# Patient Record
Sex: FEMALE | Race: WHITE | NOT HISPANIC OR LATINO | Employment: UNEMPLOYED | URBAN - METROPOLITAN AREA
[De-identification: names, ages, dates, MRNs, and addresses within clinical notes are randomized per-mention and may not be internally consistent; named-entity substitution may affect disease eponyms.]

---

## 2021-12-22 ENCOUNTER — HISTORICAL (OUTPATIENT)
Dept: ADMINISTRATIVE | Facility: HOSPITAL | Age: 26
End: 2021-12-22

## 2024-02-15 ENCOUNTER — OFFICE VISIT (OUTPATIENT)
Dept: URGENT CARE | Facility: CLINIC | Age: 29
End: 2024-02-15
Payer: MEDICAID

## 2024-02-15 VITALS
HEART RATE: 70 BPM | DIASTOLIC BLOOD PRESSURE: 74 MMHG | HEIGHT: 67 IN | TEMPERATURE: 98 F | OXYGEN SATURATION: 98 % | RESPIRATION RATE: 20 BRPM | BODY MASS INDEX: 28.89 KG/M2 | SYSTOLIC BLOOD PRESSURE: 118 MMHG | WEIGHT: 184.06 LBS

## 2024-02-15 DIAGNOSIS — H65.91 RIGHT NON-SUPPURATIVE OTITIS MEDIA: Primary | ICD-10-CM

## 2024-02-15 DIAGNOSIS — J02.9 SORE THROAT: ICD-10-CM

## 2024-02-15 DIAGNOSIS — R05.9 COUGH, UNSPECIFIED TYPE: ICD-10-CM

## 2024-02-15 LAB
CTP QC/QA: YES
MOLECULAR STREP A: NEGATIVE
POC MOLECULAR INFLUENZA A AGN: NEGATIVE
POC MOLECULAR INFLUENZA B AGN: NEGATIVE
SARS-COV-2 RDRP RESP QL NAA+PROBE: NEGATIVE

## 2024-02-15 PROCEDURE — 99214 OFFICE O/P EST MOD 30 MIN: CPT | Mod: PBBFAC | Performed by: FAMILY MEDICINE

## 2024-02-15 PROCEDURE — 99203 OFFICE O/P NEW LOW 30 MIN: CPT | Mod: S$PBB,,, | Performed by: FAMILY MEDICINE

## 2024-02-15 PROCEDURE — 87635 SARS-COV-2 COVID-19 AMP PRB: CPT | Mod: PBBFAC | Performed by: FAMILY MEDICINE

## 2024-02-15 PROCEDURE — 87502 INFLUENZA DNA AMP PROBE: CPT | Mod: PBBFAC | Performed by: FAMILY MEDICINE

## 2024-02-15 PROCEDURE — 87651 STREP A DNA AMP PROBE: CPT | Mod: PBBFAC | Performed by: FAMILY MEDICINE

## 2024-02-15 RX ORDER — ONDANSETRON 4 MG/1
4 TABLET, ORALLY DISINTEGRATING ORAL EVERY 8 HOURS PRN
COMMUNITY
Start: 2024-02-06

## 2024-02-15 RX ORDER — AMOXICILLIN AND CLAVULANATE POTASSIUM 875; 125 MG/1; MG/1
1 TABLET, FILM COATED ORAL 2 TIMES DAILY
Qty: 20 TABLET | Refills: 0 | Status: SHIPPED | OUTPATIENT
Start: 2024-02-15 | End: 2024-02-25

## 2024-02-15 NOTE — PROGRESS NOTES
"Subjective:       Patient ID: Kennedi Adrian is a 28 y.o. female.    Vitals:  height is 5' 7" (1.702 m) and weight is 83.5 kg (184 lb 1.4 oz). Her temperature is 98 °F (36.7 °C). Her blood pressure is 118/74 and her pulse is 70. Her respiration is 20 and oxygen saturation is 98%.     Chief Complaint: URI (Cough,sore throat x 5days)    Patient is 10 weeks pregnant.  Having continued cough, postnasal drip, but now right ear pain, no drainage.  No fever.    URI   Associated symptoms include congestion, coughing and ear pain. Pertinent negatives include no chest pain, joint swelling, rash, sinus pain, swollen glands, vomiting or wheezing.         HENT:  Positive for ear pain and congestion. Negative for sinus pain.    Cardiovascular:  Negative for chest pain.   Respiratory:  Positive for cough. Negative for wheezing.    Gastrointestinal:  Negative for vomiting.   Skin:  Negative for rash.       Objective:   Physical Exam   Constitutional: She appears well-developed.  Non-toxic appearance. She does not appear ill. No distress.   HENT:   Head: Atraumatic.   Ears:   Right Ear: No mastoid tenderness. Tympanic membrane is injected. Tympanic membrane is not perforated.   Left Ear: Tympanic membrane normal.   Nose: No purulent discharge. Right sinus exhibits no maxillary sinus tenderness and no frontal sinus tenderness. Left sinus exhibits no maxillary sinus tenderness and no frontal sinus tenderness.   Mouth/Throat: Uvula is midline. No uvula swelling. Cobblestoning present.   Eyes: Right eye exhibits no discharge. Left eye exhibits no discharge. Extraocular movement intact   Neck: Neck supple. No neck rigidity present.   Cardiovascular: Regular rhythm.   Pulmonary/Chest: Effort normal and breath sounds normal. No respiratory distress. She has no wheezes. She has no rales.   Lymphadenopathy:     She has no cervical adenopathy.   Neurological: She is alert.   Skin: Skin is warm, dry and not diaphoretic.   Psychiatric: Her " behavior is normal.   Nursing note and vitals reviewed.    Results for orders placed or performed in visit on 02/15/24   POCT COVID-19 Rapid Screening   Result Value Ref Range    POC Rapid COVID Negative Negative     Acceptable Yes    POCT Influenza A/B Molecular   Result Value Ref Range    POC Molecular Influenza A Ag Negative Negative, Not Reported    POC Molecular Influenza B Ag Negative Negative, Not Reported     Acceptable Yes    POCT Strep A, Molecular   Result Value Ref Range    Molecular Strep A, POC Negative Negative     Acceptable Yes          Assessment:     1. Right non-suppurative otitis media    2. Cough, unspecified type    3. Sore throat          Plan:   Will treat right otitis media with Augmentin.  Encouraged fluids.  She understands to clear all medications with her OB provider.  ER precautions.  Return to urgent care if we can help.    Right non-suppurative otitis media  -     amoxicillin-clavulanate 875-125mg (AUGMENTIN) 875-125 mg per tablet; Take 1 tablet by mouth 2 (two) times daily. for 10 days  Dispense: 20 tablet; Refill: 0    Cough, unspecified type  -     POCT COVID-19 Rapid Screening  -     POCT Influenza A/B Molecular  -     POCT Strep A, Molecular    Sore throat  -     POCT COVID-19 Rapid Screening  -     POCT Influenza A/B Molecular  -     POCT Strep A, Molecular        Please note: This chart was completed via voice to text dictation. It may contain typographical/word recognition errors. If there are any questions, please contact the provider for final clarification.

## 2024-03-05 ENCOUNTER — LAB VISIT (OUTPATIENT)
Dept: LAB | Facility: HOSPITAL | Age: 29
End: 2024-03-05
Attending: OBSTETRICS & GYNECOLOGY
Payer: MEDICAID

## 2024-03-05 DIAGNOSIS — Z34.02 ENCOUNTER FOR SUPERVISION OF NORMAL FIRST PREGNANCY IN SECOND TRIMESTER: Primary | ICD-10-CM

## 2024-03-05 LAB
ERYTHROCYTE [DISTWIDTH] IN BLOOD BY AUTOMATED COUNT: 13 % (ref 11.5–17)
FT4I SERPL CALC-MCNC: 2.54 (ref 2.6–3.6)
GROUP & RH: NORMAL
HCT VFR BLD AUTO: 35.5 % (ref 37–47)
HGB BLD-MCNC: 12.4 G/DL (ref 12–16)
INDIRECT COOMBS: NORMAL
MCH RBC QN AUTO: 29.6 PG (ref 27–31)
MCHC RBC AUTO-ENTMCNC: 34.9 G/DL (ref 33–36)
MCV RBC AUTO: 84.7 FL (ref 80–94)
NRBC BLD AUTO-RTO: 0 %
PLATELET # BLD AUTO: 230 X10(3)/MCL (ref 130–400)
PMV BLD AUTO: 10.5 FL (ref 7.4–10.4)
RBC # BLD AUTO: 4.19 X10(6)/MCL (ref 4.2–5.4)
SPECIMEN OUTDATE: NORMAL
T PALLIDUM AB SER QL: NONREACTIVE
T3RU NFR SERPL: 26.68 % (ref 31–39)
T4 SERPL-MCNC: 9.51 UG/DL (ref 4.87–11.72)
TSH SERPL-ACNC: 3.37 UIU/ML (ref 0.35–4.94)
WBC # SPEC AUTO: 8.02 X10(3)/MCL (ref 4.5–11.5)

## 2024-03-05 PROCEDURE — 87086 URINE CULTURE/COLONY COUNT: CPT

## 2024-03-05 PROCEDURE — 84443 ASSAY THYROID STIM HORMONE: CPT

## 2024-03-05 PROCEDURE — 87340 HEPATITIS B SURFACE AG IA: CPT

## 2024-03-05 PROCEDURE — 36415 COLL VENOUS BLD VENIPUNCTURE: CPT

## 2024-03-05 PROCEDURE — 84436 ASSAY OF TOTAL THYROXINE: CPT

## 2024-03-05 PROCEDURE — 86762 RUBELLA ANTIBODY: CPT

## 2024-03-05 PROCEDURE — 86803 HEPATITIS C AB TEST: CPT

## 2024-03-05 PROCEDURE — 87389 HIV-1 AG W/HIV-1&-2 AB AG IA: CPT

## 2024-03-05 PROCEDURE — 85027 COMPLETE CBC AUTOMATED: CPT

## 2024-03-05 PROCEDURE — 86780 TREPONEMA PALLIDUM: CPT

## 2024-03-05 PROCEDURE — 86850 RBC ANTIBODY SCREEN: CPT | Performed by: OBSTETRICS & GYNECOLOGY

## 2024-03-05 PROCEDURE — 85660 RBC SICKLE CELL TEST: CPT

## 2024-03-06 LAB
HBV SURFACE AG SERPL QL IA: NONREACTIVE
HCV AB SERPL QL IA: NONREACTIVE
HGB S BLD QL SOLY: NEGATIVE
HIV 1+2 AB+HIV1 P24 AG SERPL QL IA: NONREACTIVE
RUBV IGG SERPL IA-ACNC: 0.6
RUBV IGG SERPL QL IA: NEGATIVE

## 2024-03-07 LAB — BACTERIA UR CULT: NORMAL

## 2024-07-03 ENCOUNTER — HOSPITAL ENCOUNTER (EMERGENCY)
Facility: HOSPITAL | Age: 29
Discharge: HOME | End: 2024-07-03
Payer: MEDICAID

## 2024-07-03 ENCOUNTER — HOSPITAL ENCOUNTER (OUTPATIENT)
Facility: HOSPITAL | Age: 29
Discharge: OTHER INSTITUTION | End: 2024-07-03
Attending: OBSTETRICS & GYNECOLOGY | Admitting: OBSTETRICS & GYNECOLOGY
Payer: MEDICAID

## 2024-07-03 VITALS
RESPIRATION RATE: 18 BRPM | OXYGEN SATURATION: 100 % | WEIGHT: 220 LBS | HEIGHT: 67 IN | BODY MASS INDEX: 34.53 KG/M2 | DIASTOLIC BLOOD PRESSURE: 71 MMHG | SYSTOLIC BLOOD PRESSURE: 125 MMHG | TEMPERATURE: 98 F | HEART RATE: 68 BPM

## 2024-07-03 VITALS
DIASTOLIC BLOOD PRESSURE: 67 MMHG | SYSTOLIC BLOOD PRESSURE: 126 MMHG | HEART RATE: 90 BPM | RESPIRATION RATE: 20 BRPM | OXYGEN SATURATION: 99 % | TEMPERATURE: 98 F

## 2024-07-03 DIAGNOSIS — O28.8 EQUIVOCAL NON-STRESS TEST: Primary | ICD-10-CM

## 2024-07-03 DIAGNOSIS — Z03.71 ENCOUNTER FOR SUSPECTED PROM, WITH RUPTURE OF MEMBRANES NOT FOUND: Primary | ICD-10-CM

## 2024-07-03 DIAGNOSIS — O47.9 UTERINE CONTRACTIONS: ICD-10-CM

## 2024-07-03 PROBLEM — Z3A.30 30 WEEKS GESTATION OF PREGNANCY: Status: ACTIVE | Noted: 2024-07-03

## 2024-07-03 LAB
BACTERIA #/AREA URNS AUTO: ABNORMAL /HPF
BILIRUB UR QL STRIP.AUTO: NEGATIVE
CLARITY UR: CLEAR
COLOR UR AUTO: YELLOW
GLUCOSE UR QL STRIP: NEGATIVE
HGB UR QL STRIP: NEGATIVE
KETONES UR QL STRIP: NEGATIVE
LEUKOCYTE ESTERASE UR QL STRIP: ABNORMAL
NITRITE UR QL STRIP: NEGATIVE
PH UR STRIP: 7 [PH]
PROT UR QL STRIP: NEGATIVE
RBC #/AREA URNS AUTO: ABNORMAL /HPF
ROMPLUS TEST: NEGATIVE
SP GR UR STRIP.AUTO: 1.02 (ref 1–1.03)
SQUAMOUS #/AREA URNS AUTO: ABNORMAL /HPF
UROBILINOGEN UR STRIP-ACNC: 0.2
WBC #/AREA URNS AUTO: ABNORMAL /HPF

## 2024-07-03 PROCEDURE — 99211 OFF/OP EST MAY X REQ PHY/QHP: CPT | Mod: 25

## 2024-07-03 PROCEDURE — 99284 EMERGENCY DEPT VISIT MOD MDM: CPT | Mod: 25,27

## 2024-07-03 PROCEDURE — 81003 URINALYSIS AUTO W/O SCOPE: CPT | Performed by: OBSTETRICS & GYNECOLOGY

## 2024-07-03 PROCEDURE — 63600175 PHARM REV CODE 636 W HCPCS: Performed by: OBSTETRICS & GYNECOLOGY

## 2024-07-03 PROCEDURE — 84112 EVAL AMNIOTIC FLUID PROTEIN: CPT

## 2024-07-03 PROCEDURE — 84112 EVAL AMNIOTIC FLUID PROTEIN: CPT | Performed by: OBSTETRICS & GYNECOLOGY

## 2024-07-03 PROCEDURE — 81015 MICROSCOPIC EXAM OF URINE: CPT | Performed by: OBSTETRICS & GYNECOLOGY

## 2024-07-03 RX ORDER — ACETAMINOPHEN 500 MG
500 TABLET ORAL EVERY 6 HOURS PRN
Status: DISCONTINUED | OUTPATIENT
Start: 2024-07-03 | End: 2024-07-03 | Stop reason: HOSPADM

## 2024-07-03 RX ORDER — SODIUM CHLORIDE, SODIUM LACTATE, POTASSIUM CHLORIDE, CALCIUM CHLORIDE 600; 310; 30; 20 MG/100ML; MG/100ML; MG/100ML; MG/100ML
INJECTION, SOLUTION INTRAVENOUS CONTINUOUS
Status: DISCONTINUED | OUTPATIENT
Start: 2024-07-03 | End: 2024-07-03 | Stop reason: HOSPADM

## 2024-07-03 RX ORDER — ONDANSETRON 4 MG/1
8 TABLET, ORALLY DISINTEGRATING ORAL EVERY 8 HOURS PRN
Status: DISCONTINUED | OUTPATIENT
Start: 2024-07-03 | End: 2024-07-03 | Stop reason: HOSPADM

## 2024-07-03 RX ADMIN — SODIUM CHLORIDE, POTASSIUM CHLORIDE, SODIUM LACTATE AND CALCIUM CHLORIDE: 600; 310; 30; 20 INJECTION, SOLUTION INTRAVENOUS at 01:07

## 2024-07-03 NOTE — PROVIDER TRANSFER
HISTORY AND PHYSICAL                                                OBSTETRICS          Subjective:      Kennedi Adrian is a 29 y.o.  female with IUP at 30w1d weeks gestation who presents to L&D  c/o ? LOF this morning. Was found to have negative Nitrazine, negative and a normal KELLE. However, during morniting was noted to have occasional variable decelerations, BPP 8/10.    States positive fetal movement. Denies vaginal bleeding, vaginal discharge,   or contractions. Negative Preeclampsia ROS.     Pertinent medical history for this pregnancy includes states abnl Osul with OB.      Care this pregnancy has been with Dr. Chu    PMHx: History reviewed. No pertinent past medical history.    PSHx: Breast Augmentation     All: Review of patient's allergies indicates:  No Known Allergies    Meds:   Medications Prior to Admission   Medication Sig Dispense Refill Last Dose    ondansetron (ZOFRAN-ODT) 4 MG TbDL Take 4 mg by mouth every 8 (eight) hours as needed.          SH:   Social History     Socioeconomic History    Marital status: Single   Tobacco Use    Smoking status: Never    Smokeless tobacco: Never   Substance and Sexual Activity    Alcohol use: Not Currently    Drug use: Not Currently    Sexual activity: Yes     Partners: Male       FH:   Family History   Problem Relation Name Age of Onset    No Known Problems Mother      No Known Problems Father         OBHx:   OB History    Para Term  AB Living   1 0 0 0 0 0   SAB IAB Ectopic Multiple Live Births   0 0 0 0 0      # Outcome Date GA Lbr Glenn/2nd Weight Sex Type Anes PTL Lv   1 Current                Objective:      /67   Pulse 90   Temp 98 °F (36.7 °C)   Resp 20   SpO2 99%        Chaperone present.       Constitutional: General appearance: healthy, well-nourished and well-developed  Psychiatric:  Orientation to time, place and person. Normal mood and affect and active, alert   Abdomen: Auscultation/Inspection/Palpation:  "GRAVIDNo tenderness or masses. Soft, nondistended      Female Genitalia:      Vulva: no masses, atrophy or lesions      Bladder/Urethra: no urethral discharge or mass, normal meatus, bladder non-distended.   Crx: cl/th/hi         ABO/Rh:   Lab Results   Component Value Date/Time    GROUPTRH O POS 2024 04:06 PM     H&H:  Lab Results   Component Value Date/Time    HGB 12.4 2024 04:06 PM    HCT 35.5 (L) 2024 04:06 PM     Platelet:  Lab Results   Component Value Date/Time     2024 04:06 PM     Osulivan: No results found for: "YATB5PW", "VSRP3FQ", "FVJA9SQ"  HIV:   Lab Results   Component Value Date/Time    HIV Nonreactive 2024 04:06 PM     RPR:  Lab Results   Component Value Date/Time    SYPHAB Nonreactive 2024 04:06 PM     Hepatitis B Surface Antigen:  Lab Results   Component Value Date/Time    HEPBSAG Nonreactive 2024 04:06 PM     Hepatitis C:  Lab Results   Component Value Date/Time    HEPCAB Nonreactive 2024 04:06 PM     Rubella Immune Status:  Lab Results   Component Value Date/Time    RUBABIGG Negative 2024 04:06 PM    RUBABIGGINDX 0.6 2024 04:06 PM           Assessment:     29 y.o.  at 30w1d weeks gestation.       Active Hospital Problems    Diagnosis  POA    *Equivocal non-stress test [O28.8]  Yes    30 weeks gestation of pregnancy [Z3A.30]  Not Applicable      Resolved Hospital Problems   No resolved problems to display.          Plan:     1. Equivocal fetal testing, continued  occasional variable decelerations. [facility] on diversion. Pt to be transferred to  triage for prolonged monitoring accepted by Dr. Berg. Cont IVF. Fetal testing currently reassuring and stable for transfer. PT currently asymptomatic.         " show

## 2024-07-03 NOTE — ED PROVIDER NOTES
Encounter Date: 7/3/2024       History     Chief Complaint   Patient presents with    fetal monitoring     Iup at 30.1 transferred from [facility] for further monitoring after a fetal decel was noted while being evaluated for possible pprom. Ob is sheila Kolb Eliane Adrian is a 29 y.o.  female with IUP at 30w1d weeks gestation who was transferred from [place], [place] for fetal monitoring. She presented complaining of leakage of fluid. Her ROM plus was negative. While there she had variables on fetal monitoring. BPP was done and 8/10. OB at the outside hospital desired prolonged monitoring but was unable to accommodate the patient due to being on diversion.    This IUP is uncomplicated. Patient denies contractions, denies vaginal bleeding, denies LOF.   Fetal Movement: normal.    The history is provided by the patient.     Review of patient's allergies indicates:  No Known Allergies  No past medical history on file.  No past surgical history on file.  Family History   Problem Relation Name Age of Onset    No Known Problems Mother      No Known Problems Father       Social History     Tobacco Use    Smoking status: Never    Smokeless tobacco: Never   Substance Use Topics    Alcohol use: Not Currently    Drug use: Not Currently     Review of Systems   Constitutional:  Negative for chills and fatigue.   Respiratory:  Negative for chest tightness and shortness of breath.    Cardiovascular:  Negative for chest pain.   Gastrointestinal:  Negative for abdominal pain, nausea and vomiting.   Genitourinary:  Negative for frequency, menstrual problem, pelvic pain, vaginal bleeding and vaginal discharge.   Musculoskeletal:  Negative for back pain.   Neurological:  Negative for dizziness and light-headedness.   Psychiatric/Behavioral:  Negative for confusion and sleep disturbance. The patient is not nervous/anxious.        Physical Exam     Initial Vitals   BP Pulse Resp Temp SpO2   24 1533 24 1530 24 1541  07/03/24 1541 07/03/24 1530   125/71 72 18 98 °F (36.7 °C) 100 %      MAP       --                Physical Exam    Nursing note and vitals reviewed.  Constitutional: She appears well-developed and well-nourished. She is not diaphoretic. No distress.   HENT:   Head: Normocephalic and atraumatic.   Eyes: Conjunctivae and EOM are normal.   Neck:   Normal range of motion.  Cardiovascular:  Normal rate.           Pulmonary/Chest: No respiratory distress.   Abdominal: Abdomen is soft. There is no abdominal tenderness.   Genitourinary:    No vaginal discharge.     Musculoskeletal:         General: Normal range of motion.      Cervical back: Normal range of motion.     Neurological: She is alert and oriented to person, place, and time.   Skin: Skin is warm and dry.   Psychiatric: She has a normal mood and affect.     OB LABOR EXAM:   Pre-Term Labor: No.   Membranes ruptured: No.     Vaginal Bleeding: none present.     Dilatation: 0.       Amniotic Fluid Color: no fluid.     Comments: NST Interpretation:   140 BPM baseline  Variability: moderate  Accelerations: present  Decelerations: occasional variables  Contractions: none    Clinical Impression: Reactive Non-Stress Test         ED Course   Procedures  Labs Reviewed - No data to display       Imaging Results    None          Medications - No data to display  Medical Decision Making  Problems Addressed:  Encounter for suspected PROM, with rupture of membranes not found:     Details: - ROM plus negative  - NST reassuring  - BPP 8/10   - Precautions given to patient        Clinical Impression:  Final diagnoses:  [Z03.71] Encounter for suspected PROM, with rupture of membranes not found (Primary)         Patient Instructions:   - Pt was given routine pregnancy instructions including to return to triage if she has any vaginal bleeding, any loss of fluid like her water broke, decreased fetal movement, or contractions Q 5min lasting for 2 or more hours.     Pt was also instructed  to drink copious amounts of water. Patient voiced understanding of all these instructions and was subsequently discharged home. Tylenol use and maternity belt use discussed. All questions answered. Pt left [facility] with good understanding of plan.   Preeclampsia/ROM/labor/fever/decreased FM with [facility] precautions discussed, voiced understanding      She will follow up with her primary OB as scheduled       ED Disposition Condition    Discharge Good          ED Prescriptions    None       Follow-up Information    None          Kym Wild MD  07/03/24 [id number]

## 2024-08-21 ENCOUNTER — LAB REQUISITION (OUTPATIENT)
Dept: LAB | Facility: HOSPITAL | Age: 29
End: 2024-08-21
Payer: MEDICAID

## 2024-08-21 DIAGNOSIS — Z34.80 ENCOUNTER FOR SUPERVISION OF OTHER NORMAL PREGNANCY, UNSPECIFIED TRIMESTER: ICD-10-CM

## 2024-08-21 LAB — PRENATAL STREP B CULTURE: NEGATIVE

## 2024-08-21 PROCEDURE — 87081 CULTURE SCREEN ONLY: CPT | Performed by: OBSTETRICS & GYNECOLOGY

## 2024-08-24 LAB — BACTERIA SPEC CULT: NORMAL

## 2024-09-04 ENCOUNTER — HOSPITAL ENCOUNTER (INPATIENT)
Facility: HOSPITAL | Age: 29
LOS: 4 days | Discharge: HOME | End: 2024-09-08
Attending: OBSTETRICS & GYNECOLOGY | Admitting: OBSTETRICS & GYNECOLOGY
Payer: MEDICAID

## 2024-09-04 ENCOUNTER — ANESTHESIA (OUTPATIENT)
Dept: OBSTETRICS AND GYNECOLOGY | Facility: HOSPITAL | Age: 29
End: 2024-09-04
Payer: MEDICAID

## 2024-09-04 ENCOUNTER — ANESTHESIA EVENT (OUTPATIENT)
Dept: OBSTETRICS AND GYNECOLOGY | Facility: HOSPITAL | Age: 29
End: 2024-09-04
Payer: MEDICAID

## 2024-09-04 DIAGNOSIS — Z3A.30 30 WEEKS GESTATION OF PREGNANCY: ICD-10-CM

## 2024-09-04 DIAGNOSIS — Z34.90 ENCOUNTER FOR INDUCTION OF LABOR: Primary | ICD-10-CM

## 2024-09-04 DIAGNOSIS — O28.8 EQUIVOCAL NON-STRESS TEST: ICD-10-CM

## 2024-09-04 DIAGNOSIS — O61.9 FAILED INDUCTION OF LABOR: ICD-10-CM

## 2024-09-04 LAB
BASOPHILS # BLD AUTO: 0.01 X10(3)/MCL
BASOPHILS NFR BLD AUTO: 0.1 %
EOSINOPHIL # BLD AUTO: 0.07 X10(3)/MCL (ref 0–0.9)
EOSINOPHIL NFR BLD AUTO: 0.8 %
ERYTHROCYTE [DISTWIDTH] IN BLOOD BY AUTOMATED COUNT: 14 % (ref 11.5–17)
GROUP & RH: NORMAL
HCT VFR BLD AUTO: 29.2 % (ref 37–47)
HGB BLD-MCNC: 9.2 G/DL (ref 12–16)
IMM GRANULOCYTES # BLD AUTO: 0.14 X10(3)/MCL (ref 0–0.04)
IMM GRANULOCYTES NFR BLD AUTO: 1.6 %
INDIRECT COOMBS: NORMAL
LYMPHOCYTES # BLD AUTO: 2.25 X10(3)/MCL (ref 0.6–4.6)
LYMPHOCYTES NFR BLD AUTO: 25.1 %
MCH RBC QN AUTO: 24.3 PG (ref 27–31)
MCHC RBC AUTO-ENTMCNC: 31.5 G/DL (ref 33–36)
MCV RBC AUTO: 77 FL (ref 80–94)
MONOCYTES # BLD AUTO: 1.41 X10(3)/MCL (ref 0.1–1.3)
MONOCYTES NFR BLD AUTO: 15.7 %
NEUTROPHILS # BLD AUTO: 5.09 X10(3)/MCL (ref 2.1–9.2)
NEUTROPHILS NFR BLD AUTO: 56.7 %
NRBC BLD AUTO-RTO: 0 %
PLATELET # BLD AUTO: 215 X10(3)/MCL (ref 130–400)
PMV BLD AUTO: 11.2 FL (ref 7.4–10.4)
RBC # BLD AUTO: 3.79 X10(6)/MCL (ref 4.2–5.4)
SPECIMEN OUTDATE: NORMAL
T PALLIDUM AB SER QL: NONREACTIVE
WBC # BLD AUTO: 8.97 X10(3)/MCL (ref 4.5–11.5)

## 2024-09-04 PROCEDURE — 63600175 PHARM REV CODE 636 W HCPCS: Performed by: OBSTETRICS & GYNECOLOGY

## 2024-09-04 PROCEDURE — 11000001 HC ACUTE MED/SURG PRIVATE ROOM

## 2024-09-04 PROCEDURE — 85025 COMPLETE CBC W/AUTO DIFF WBC: CPT | Performed by: OBSTETRICS & GYNECOLOGY

## 2024-09-04 PROCEDURE — 86780 TREPONEMA PALLIDUM: CPT | Performed by: OBSTETRICS & GYNECOLOGY

## 2024-09-04 PROCEDURE — 86850 RBC ANTIBODY SCREEN: CPT | Performed by: OBSTETRICS & GYNECOLOGY

## 2024-09-04 PROCEDURE — 25000003 PHARM REV CODE 250: Performed by: OBSTETRICS & GYNECOLOGY

## 2024-09-04 PROCEDURE — 86900 BLOOD TYPING SEROLOGIC ABO: CPT | Performed by: OBSTETRICS & GYNECOLOGY

## 2024-09-04 RX ORDER — MISOPROSTOL 100 UG/1
800 TABLET ORAL ONCE AS NEEDED
Status: DISCONTINUED | OUTPATIENT
Start: 2024-09-04 | End: 2024-09-04

## 2024-09-04 RX ORDER — ONDANSETRON 4 MG/1
8 TABLET, ORALLY DISINTEGRATING ORAL EVERY 8 HOURS PRN
Status: DISCONTINUED | OUTPATIENT
Start: 2024-09-04 | End: 2024-09-04

## 2024-09-04 RX ORDER — OXYTOCIN 10 [USP'U]/ML
10 INJECTION, SOLUTION INTRAMUSCULAR; INTRAVENOUS ONCE AS NEEDED
Status: DISCONTINUED | OUTPATIENT
Start: 2024-09-04 | End: 2024-09-04

## 2024-09-04 RX ORDER — OXYTOCIN-SODIUM CHLORIDE 0.9% IV SOLN 30 UNIT/500ML 30-0.9/5 UT/ML-%
10 SOLUTION INTRAVENOUS ONCE AS NEEDED
Status: DISCONTINUED | OUTPATIENT
Start: 2024-09-04 | End: 2024-09-08 | Stop reason: HOSPADM

## 2024-09-04 RX ORDER — DIPHENOXYLATE HYDROCHLORIDE AND ATROPINE SULFATE 2.5; .025 MG/1; MG/1
2 TABLET ORAL EVERY 6 HOURS PRN
Status: DISCONTINUED | OUTPATIENT
Start: 2024-09-04 | End: 2024-09-04

## 2024-09-04 RX ORDER — ONDANSETRON 4 MG/1
8 TABLET, ORALLY DISINTEGRATING ORAL EVERY 8 HOURS PRN
Status: DISCONTINUED | OUTPATIENT
Start: 2024-09-04 | End: 2024-09-08 | Stop reason: HOSPADM

## 2024-09-04 RX ORDER — CARBOPROST TROMETHAMINE 250 UG/ML
250 INJECTION, SOLUTION INTRAMUSCULAR
Status: DISCONTINUED | OUTPATIENT
Start: 2024-09-04 | End: 2024-09-08 | Stop reason: HOSPADM

## 2024-09-04 RX ORDER — MISOPROSTOL 100 UG/1
800 TABLET ORAL ONCE AS NEEDED
Status: DISCONTINUED | OUTPATIENT
Start: 2024-09-04 | End: 2024-09-08 | Stop reason: HOSPADM

## 2024-09-04 RX ORDER — METHYLERGONOVINE MALEATE 0.2 MG/ML
200 INJECTION INTRAVENOUS ONCE AS NEEDED
Status: DISCONTINUED | OUTPATIENT
Start: 2024-09-04 | End: 2024-09-04

## 2024-09-04 RX ORDER — SODIUM CHLORIDE, SODIUM LACTATE, POTASSIUM CHLORIDE, CALCIUM CHLORIDE 600; 310; 30; 20 MG/100ML; MG/100ML; MG/100ML; MG/100ML
INJECTION, SOLUTION INTRAVENOUS CONTINUOUS
Status: DISCONTINUED | OUTPATIENT
Start: 2024-09-04 | End: 2024-09-08 | Stop reason: HOSPADM

## 2024-09-04 RX ORDER — OXYTOCIN-SODIUM CHLORIDE 0.9% IV SOLN 30 UNIT/500ML 30-0.9/5 UT/ML-%
95 SOLUTION INTRAVENOUS ONCE AS NEEDED
Status: DISCONTINUED | OUTPATIENT
Start: 2024-09-04 | End: 2024-09-04

## 2024-09-04 RX ORDER — OXYTOCIN-SODIUM CHLORIDE 0.9% IV SOLN 30 UNIT/500ML 30-0.9/5 UT/ML-%
10 SOLUTION INTRAVENOUS ONCE
Status: DISCONTINUED | OUTPATIENT
Start: 2024-09-04 | End: 2024-09-08 | Stop reason: HOSPADM

## 2024-09-04 RX ORDER — CALCIUM CARBONATE 200(500)MG
500 TABLET,CHEWABLE ORAL 3 TIMES DAILY PRN
Status: DISCONTINUED | OUTPATIENT
Start: 2024-09-04 | End: 2024-09-04

## 2024-09-04 RX ORDER — CALCIUM CARBONATE 200(500)MG
500 TABLET,CHEWABLE ORAL 3 TIMES DAILY PRN
Status: DISCONTINUED | OUTPATIENT
Start: 2024-09-04 | End: 2024-09-08 | Stop reason: HOSPADM

## 2024-09-04 RX ORDER — OXYTOCIN-SODIUM CHLORIDE 0.9% IV SOLN 30 UNIT/500ML 30-0.9/5 UT/ML-%
95 SOLUTION INTRAVENOUS ONCE
Status: DISCONTINUED | OUTPATIENT
Start: 2024-09-04 | End: 2024-09-08 | Stop reason: HOSPADM

## 2024-09-04 RX ORDER — OXYTOCIN-SODIUM CHLORIDE 0.9% IV SOLN 30 UNIT/500ML 30-0.9/5 UT/ML-%
95 SOLUTION INTRAVENOUS ONCE AS NEEDED
Status: DISCONTINUED | OUTPATIENT
Start: 2024-09-04 | End: 2024-09-08 | Stop reason: HOSPADM

## 2024-09-04 RX ORDER — LIDOCAINE HYDROCHLORIDE 10 MG/ML
10 INJECTION, SOLUTION INFILTRATION; PERINEURAL ONCE AS NEEDED
Status: DISCONTINUED | OUTPATIENT
Start: 2024-09-04 | End: 2024-09-08 | Stop reason: HOSPADM

## 2024-09-04 RX ORDER — MEPERIDINE HYDROCHLORIDE 25 MG/ML
25 INJECTION INTRAMUSCULAR; INTRAVENOUS; SUBCUTANEOUS ONCE
Status: COMPLETED | OUTPATIENT
Start: 2024-09-04 | End: 2024-09-04

## 2024-09-04 RX ORDER — METHYLERGONOVINE MALEATE 0.2 MG/ML
200 INJECTION INTRAVENOUS ONCE AS NEEDED
Status: DISCONTINUED | OUTPATIENT
Start: 2024-09-04 | End: 2024-09-08 | Stop reason: HOSPADM

## 2024-09-04 RX ORDER — DIPHENOXYLATE HYDROCHLORIDE AND ATROPINE SULFATE 2.5; .025 MG/1; MG/1
2 TABLET ORAL EVERY 6 HOURS PRN
Status: DISCONTINUED | OUTPATIENT
Start: 2024-09-04 | End: 2024-09-08 | Stop reason: HOSPADM

## 2024-09-04 RX ORDER — MUPIROCIN 20 MG/G
OINTMENT TOPICAL
OUTPATIENT
Start: 2024-09-04

## 2024-09-04 RX ORDER — OXYTOCIN 10 [USP'U]/ML
10 INJECTION, SOLUTION INTRAMUSCULAR; INTRAVENOUS ONCE AS NEEDED
Status: DISCONTINUED | OUTPATIENT
Start: 2024-09-04 | End: 2024-09-08 | Stop reason: HOSPADM

## 2024-09-04 RX ORDER — CARBOPROST TROMETHAMINE 250 UG/ML
250 INJECTION, SOLUTION INTRAMUSCULAR
Status: DISCONTINUED | OUTPATIENT
Start: 2024-09-04 | End: 2024-09-04

## 2024-09-04 RX ORDER — MISOPROSTOL 100 MCG
50 TABLET ORAL ONCE
Status: COMPLETED | OUTPATIENT
Start: 2024-09-04 | End: 2024-09-04

## 2024-09-04 RX ORDER — OXYTOCIN-SODIUM CHLORIDE 0.9% IV SOLN 30 UNIT/500ML 30-0.9/5 UT/ML-%
10 SOLUTION INTRAVENOUS ONCE AS NEEDED
Status: DISCONTINUED | OUTPATIENT
Start: 2024-09-04 | End: 2024-09-04

## 2024-09-04 RX ORDER — SIMETHICONE 80 MG
1 TABLET,CHEWABLE ORAL 4 TIMES DAILY PRN
Status: DISCONTINUED | OUTPATIENT
Start: 2024-09-04 | End: 2024-09-05

## 2024-09-04 RX ADMIN — SODIUM CHLORIDE, POTASSIUM CHLORIDE, SODIUM LACTATE AND CALCIUM CHLORIDE: 600; 310; 30; 20 INJECTION, SOLUTION INTRAVENOUS at 12:09

## 2024-09-04 RX ADMIN — SODIUM CHLORIDE, POTASSIUM CHLORIDE, SODIUM LACTATE AND CALCIUM CHLORIDE: 600; 310; 30; 20 INJECTION, SOLUTION INTRAVENOUS at 04:09

## 2024-09-04 RX ADMIN — SODIUM CHLORIDE, POTASSIUM CHLORIDE, SODIUM LACTATE AND CALCIUM CHLORIDE 125 ML/HR: 600; 310; 30; 20 INJECTION, SOLUTION INTRAVENOUS at 09:09

## 2024-09-04 RX ADMIN — DINOPROSTONE 10 MG: 10 INSERT VAGINAL at 08:09

## 2024-09-04 RX ADMIN — MEPERIDINE HYDROCHLORIDE 25 MG: 25 INJECTION INTRAMUSCULAR; INTRAVENOUS; SUBCUTANEOUS at 03:09

## 2024-09-04 RX ADMIN — MISOPROSTOL 50 MCG: 100 TABLET ORAL at 10:09

## 2024-09-04 RX ADMIN — CALCIUM CARBONATE (ANTACID) CHEW TAB 500 MG 500 MG: 500 CHEW TAB at 10:09

## 2024-09-04 RX ADMIN — MISOPROSTOL 50 MCG: 100 TABLET ORAL at 05:09

## 2024-09-04 NOTE — H&P
[facility]                       [address] [address]    PATIENT NAME:       FAREED WALTON  YOB: 1995  CSN:                [id number]   MRN:                [id number]  ADMIT DATE:         09/04/2024 04:20:00  PHYSICIAN:          Alberto Chu Jr, MD                        HISTORY AND PHYSICAL      HISTORY OF PRESENT ILLNESS:  29-year-old primigravida with pregnancy at 38 weeks   3 days, admitted to obstetric unit today for induction secondary to presumed   macrosomic infant.  The patient has had prenatal care since early in pregnancy.    Pregnancy has been uneventful.  The infant's estimated fetal weight was   approximately 9 pounds.  Explained to the patient with the macrosomic infant and   the decrease in likely vaginal delivery of the infant to continue to go that   recommended induction between 38 and 39 weeks.  The patient voices good   understanding.  All aspects of induction were discussed with the patient and she   will be admitted to obstetric unit for delivery.  Refer to OB flow sheet for   history.  Her vitals were stable.  She was afebrile.    PHYSICAL EXAMINATION:  Within normal limits.    ASSESSMENT:    1. Pregnancy at 38 weeks and 3 days.  2. Presumed macrosomia.    PLAN:  Admit.        ______________________________  Alberto Chu Jr, MD    DJE/AQS  DD:  09/04/2024  Time:  07:55AM  DT:  09/04/2024  Time:  08:38AM  Job #:  [id number]      HISTORY AND PHYSICAL

## 2024-09-05 VITALS — RESPIRATION RATE: 14 BRPM

## 2024-09-05 PROCEDURE — 63600175 PHARM REV CODE 636 W HCPCS

## 2024-09-05 PROCEDURE — 62326 NJX INTERLAMINAR LMBR/SAC: CPT | Performed by: ANESTHESIOLOGY

## 2024-09-05 PROCEDURE — 36004724 HC OB OR TIME LEV III - 1ST 15 MIN: Performed by: OBSTETRICS & GYNECOLOGY

## 2024-09-05 PROCEDURE — 25000003 PHARM REV CODE 250

## 2024-09-05 PROCEDURE — 25000003 PHARM REV CODE 250: Performed by: ANESTHESIOLOGY

## 2024-09-05 PROCEDURE — 37000009 HC ANESTHESIA EA ADD 15 MINS: Performed by: OBSTETRICS & GYNECOLOGY

## 2024-09-05 PROCEDURE — 63600175 PHARM REV CODE 636 W HCPCS: Mod: JZ,JG | Performed by: ANESTHESIOLOGY

## 2024-09-05 PROCEDURE — 36004725 HC OB OR TIME LEV III - EA ADD 15 MIN: Performed by: OBSTETRICS & GYNECOLOGY

## 2024-09-05 PROCEDURE — 27000492 HC SLEEVE, SCD T/L

## 2024-09-05 PROCEDURE — 63600175 PHARM REV CODE 636 W HCPCS: Performed by: OBSTETRICS & GYNECOLOGY

## 2024-09-05 PROCEDURE — 37000008 HC ANESTHESIA 1ST 15 MINUTES: Performed by: OBSTETRICS & GYNECOLOGY

## 2024-09-05 PROCEDURE — 71000033 HC RECOVERY, INTIAL HOUR: Performed by: OBSTETRICS & GYNECOLOGY

## 2024-09-05 PROCEDURE — 11000001 HC ACUTE MED/SURG PRIVATE ROOM

## 2024-09-05 PROCEDURE — 72100003 HC LABOR CARE, EA. ADDL. 8 HRS

## 2024-09-05 PROCEDURE — 51702 INSERT TEMP BLADDER CATH: CPT

## 2024-09-05 PROCEDURE — 72100002 HC LABOR CARE, 1ST 8 HOURS

## 2024-09-05 RX ORDER — OXYTOCIN-SODIUM CHLORIDE 0.9% IV SOLN 30 UNIT/500ML 30-0.9/5 UT/ML-%
95 SOLUTION INTRAVENOUS ONCE
Status: COMPLETED | OUTPATIENT
Start: 2024-09-05 | End: 2024-09-05

## 2024-09-05 RX ORDER — OXYTOCIN-SODIUM CHLORIDE 0.9% IV SOLN 30 UNIT/500ML 30-0.9/5 UT/ML-%
0-32 SOLUTION INTRAVENOUS CONTINUOUS
Status: DISCONTINUED | OUTPATIENT
Start: 2024-09-05 | End: 2024-09-08 | Stop reason: HOSPADM

## 2024-09-05 RX ORDER — MUPIROCIN 20 MG/G
OINTMENT TOPICAL
Status: DISCONTINUED | OUTPATIENT
Start: 2024-09-05 | End: 2024-09-08 | Stop reason: HOSPADM

## 2024-09-05 RX ORDER — METHYLERGONOVINE MALEATE 0.2 MG/ML
200 INJECTION INTRAVENOUS
Status: DISCONTINUED | OUTPATIENT
Start: 2024-09-05 | End: 2024-09-08 | Stop reason: HOSPADM

## 2024-09-05 RX ORDER — CEFAZOLIN SODIUM 2 G/50ML
2 SOLUTION INTRAVENOUS ONCE AS NEEDED
Status: COMPLETED | OUTPATIENT
Start: 2024-09-05 | End: 2024-09-05

## 2024-09-05 RX ORDER — EPHEDRINE SULFATE 50 MG/ML
10 INJECTION, SOLUTION INTRAVENOUS
Status: DISCONTINUED | OUTPATIENT
Start: 2024-09-05 | End: 2024-09-08 | Stop reason: HOSPADM

## 2024-09-05 RX ORDER — BUPIVACAINE HYDROCHLORIDE 2.5 MG/ML
INJECTION, SOLUTION EPIDURAL; INFILTRATION; INTRACAUDAL
Status: COMPLETED | OUTPATIENT
Start: 2024-09-05 | End: 2024-09-05

## 2024-09-05 RX ORDER — SODIUM CHLORIDE, SODIUM LACTATE, POTASSIUM CHLORIDE, CALCIUM CHLORIDE 600; 310; 30; 20 MG/100ML; MG/100ML; MG/100ML; MG/100ML
INJECTION, SOLUTION INTRAVENOUS CONTINUOUS
Status: DISCONTINUED | OUTPATIENT
Start: 2024-09-05 | End: 2024-09-08 | Stop reason: HOSPADM

## 2024-09-05 RX ORDER — DOCUSATE SODIUM 100 MG/1
200 CAPSULE, LIQUID FILLED ORAL 2 TIMES DAILY
Status: DISCONTINUED | OUTPATIENT
Start: 2024-09-05 | End: 2024-09-08 | Stop reason: HOSPADM

## 2024-09-05 RX ORDER — OXYTOCIN-SODIUM CHLORIDE 0.9% IV SOLN 30 UNIT/500ML 30-0.9/5 UT/ML-%
95 SOLUTION INTRAVENOUS ONCE
Status: DISCONTINUED | OUTPATIENT
Start: 2024-09-05 | End: 2024-09-08 | Stop reason: HOSPADM

## 2024-09-05 RX ORDER — DIPHENHYDRAMINE HYDROCHLORIDE 50 MG/ML
25 INJECTION INTRAMUSCULAR; INTRAVENOUS EVERY 4 HOURS PRN
Status: DISCONTINUED | OUTPATIENT
Start: 2024-09-05 | End: 2024-09-08 | Stop reason: HOSPADM

## 2024-09-05 RX ORDER — BISACODYL 10 MG/1
10 SUPPOSITORY RECTAL ONCE AS NEEDED
Status: DISCONTINUED | OUTPATIENT
Start: 2024-09-05 | End: 2024-09-08 | Stop reason: HOSPADM

## 2024-09-05 RX ORDER — ACETAMINOPHEN 325 MG/1
650 TABLET ORAL EVERY 4 HOURS PRN
Status: DISCONTINUED | OUTPATIENT
Start: 2024-09-05 | End: 2024-09-08 | Stop reason: HOSPADM

## 2024-09-05 RX ORDER — FENTANYL/BUPIVACAINE/NS/PF 2-1250MCG
PLASTIC BAG, INJECTION (ML) INJECTION CONTINUOUS PRN
Status: DISCONTINUED | OUTPATIENT
Start: 2024-09-05 | End: 2024-09-05

## 2024-09-05 RX ORDER — FENTANYL/BUPIVACAINE/NS/PF 2-1250MCG
PLASTIC BAG, INJECTION (ML) INJECTION CONTINUOUS
Status: DISCONTINUED | OUTPATIENT
Start: 2024-09-05 | End: 2024-09-08 | Stop reason: HOSPADM

## 2024-09-05 RX ORDER — SIMETHICONE 80 MG
1 TABLET,CHEWABLE ORAL EVERY 4 HOURS PRN
Status: DISCONTINUED | OUTPATIENT
Start: 2024-09-05 | End: 2024-09-08 | Stop reason: HOSPADM

## 2024-09-05 RX ORDER — OXYTOCIN-SODIUM CHLORIDE 0.9% IV SOLN 30 UNIT/500ML 30-0.9/5 UT/ML-%
10 SOLUTION INTRAVENOUS ONCE
Status: DISCONTINUED | OUTPATIENT
Start: 2024-09-05 | End: 2024-09-08 | Stop reason: HOSPADM

## 2024-09-05 RX ORDER — DEXTROSE, SODIUM CHLORIDE, SODIUM LACTATE, POTASSIUM CHLORIDE, AND CALCIUM CHLORIDE 5; .6; .31; .03; .02 G/100ML; G/100ML; G/100ML; G/100ML; G/100ML
INJECTION, SOLUTION INTRAVENOUS CONTINUOUS
Status: DISCONTINUED | OUTPATIENT
Start: 2024-09-05 | End: 2024-09-08 | Stop reason: HOSPADM

## 2024-09-05 RX ORDER — DIPHENHYDRAMINE HCL 25 MG
25 CAPSULE ORAL EVERY 4 HOURS PRN
Status: DISCONTINUED | OUTPATIENT
Start: 2024-09-05 | End: 2024-09-08 | Stop reason: HOSPADM

## 2024-09-05 RX ORDER — KETOROLAC TROMETHAMINE 30 MG/ML
30 INJECTION, SOLUTION INTRAMUSCULAR; INTRAVENOUS EVERY 6 HOURS
Status: COMPLETED | OUTPATIENT
Start: 2024-09-06 | End: 2024-09-06

## 2024-09-05 RX ORDER — CARBOPROST TROMETHAMINE 250 UG/ML
250 INJECTION, SOLUTION INTRAMUSCULAR
Status: DISCONTINUED | OUTPATIENT
Start: 2024-09-05 | End: 2024-09-08 | Stop reason: HOSPADM

## 2024-09-05 RX ORDER — KETOROLAC TROMETHAMINE 30 MG/ML
INJECTION, SOLUTION INTRAMUSCULAR; INTRAVENOUS
Status: DISCONTINUED | OUTPATIENT
Start: 2024-09-05 | End: 2024-09-05

## 2024-09-05 RX ORDER — ONDANSETRON HYDROCHLORIDE 2 MG/ML
INJECTION, SOLUTION INTRAVENOUS
Status: DISCONTINUED | OUTPATIENT
Start: 2024-09-05 | End: 2024-09-05

## 2024-09-05 RX ORDER — OXYCODONE AND ACETAMINOPHEN 5; 325 MG/1; MG/1
1 TABLET ORAL EVERY 6 HOURS PRN
Status: DISCONTINUED | OUTPATIENT
Start: 2024-09-05 | End: 2024-09-07

## 2024-09-05 RX ORDER — MISOPROSTOL 100 UG/1
800 TABLET ORAL
Status: DISCONTINUED | OUTPATIENT
Start: 2024-09-05 | End: 2024-09-08 | Stop reason: HOSPADM

## 2024-09-05 RX ORDER — SODIUM CITRATE AND CITRIC ACID MONOHYDRATE 334; 500 MG/5ML; MG/5ML
30 SOLUTION ORAL
Status: DISCONTINUED | OUTPATIENT
Start: 2024-09-05 | End: 2024-09-08 | Stop reason: HOSPADM

## 2024-09-05 RX ORDER — ACETAMINOPHEN 10 MG/ML
INJECTION, SOLUTION INTRAVENOUS
Status: DISCONTINUED | OUTPATIENT
Start: 2024-09-05 | End: 2024-09-05

## 2024-09-05 RX ORDER — ADHESIVE BANDAGE
30 BANDAGE TOPICAL 2 TIMES DAILY PRN
Status: DISCONTINUED | OUTPATIENT
Start: 2024-09-06 | End: 2024-09-08 | Stop reason: HOSPADM

## 2024-09-05 RX ORDER — LIDOCAINE HYDROCHLORIDE 20 MG/ML
INJECTION, SOLUTION EPIDURAL; INFILTRATION; INTRACAUDAL; PERINEURAL
Status: DISCONTINUED | OUTPATIENT
Start: 2024-09-05 | End: 2024-09-05

## 2024-09-05 RX ORDER — FENTANYL CITRATE 50 UG/ML
INJECTION, SOLUTION INTRAMUSCULAR; INTRAVENOUS
Status: DISCONTINUED | OUTPATIENT
Start: 2024-09-05 | End: 2024-09-05

## 2024-09-05 RX ORDER — MORPHINE SULFATE 0.5 MG/ML
INJECTION, SOLUTION EPIDURAL; INTRATHECAL; INTRAVENOUS
Status: DISCONTINUED | OUTPATIENT
Start: 2024-09-05 | End: 2024-09-05

## 2024-09-05 RX ORDER — MEPERIDINE HYDROCHLORIDE 25 MG/ML
25 INJECTION INTRAMUSCULAR; INTRAVENOUS; SUBCUTANEOUS ONCE
Status: COMPLETED | OUTPATIENT
Start: 2024-09-05 | End: 2024-09-05

## 2024-09-05 RX ORDER — IBUPROFEN 600 MG/1
600 TABLET ORAL EVERY 6 HOURS
Status: DISCONTINUED | OUTPATIENT
Start: 2024-09-06 | End: 2024-09-08 | Stop reason: HOSPADM

## 2024-09-05 RX ORDER — OXYTOCIN 10 [USP'U]/ML
INJECTION, SOLUTION INTRAMUSCULAR; INTRAVENOUS
Status: DISCONTINUED | OUTPATIENT
Start: 2024-09-05 | End: 2024-09-05

## 2024-09-05 RX ORDER — FAMOTIDINE 10 MG/ML
20 INJECTION INTRAVENOUS
Status: DISCONTINUED | OUTPATIENT
Start: 2024-09-05 | End: 2024-09-08 | Stop reason: HOSPADM

## 2024-09-05 RX ORDER — OXYCODONE AND ACETAMINOPHEN 10; 325 MG/1; MG/1
1 TABLET ORAL EVERY 6 HOURS PRN
Status: DISCONTINUED | OUTPATIENT
Start: 2024-09-05 | End: 2024-09-07

## 2024-09-05 RX ORDER — NALOXONE HCL 0.4 MG/ML
0.02 VIAL (ML) INJECTION
Status: DISCONTINUED | OUTPATIENT
Start: 2024-09-05 | End: 2024-09-08 | Stop reason: HOSPADM

## 2024-09-05 RX ORDER — EPHEDRINE SULFATE 50 MG/ML
INJECTION, SOLUTION INTRAVENOUS
Status: COMPLETED
Start: 2024-09-05 | End: 2024-09-05

## 2024-09-05 RX ORDER — HYDROMORPHONE HYDROCHLORIDE 2 MG/ML
1 INJECTION, SOLUTION INTRAMUSCULAR; INTRAVENOUS; SUBCUTANEOUS EVERY 4 HOURS PRN
Status: DISCONTINUED | OUTPATIENT
Start: 2024-09-05 | End: 2024-09-08 | Stop reason: HOSPADM

## 2024-09-05 RX ADMIN — AZITHROMYCIN 500 MG: 500 INJECTION, POWDER, LYOPHILIZED, FOR SOLUTION INTRAVENOUS at 04:09

## 2024-09-05 RX ADMIN — HYDROMORPHONE HYDROCHLORIDE 1 MG: 2 INJECTION INTRAMUSCULAR; INTRAVENOUS; SUBCUTANEOUS at 07:09

## 2024-09-05 RX ADMIN — MORPHINE SULFATE 3 MG: 0.5 INJECTION, SOLUTION EPIDURAL; INTRATHECAL; INTRAVENOUS at 04:09

## 2024-09-05 RX ADMIN — LIDOCAINE HYDROCHLORIDE 15 ML: 20 INJECTION, SOLUTION EPIDURAL; INFILTRATION; INTRACAUDAL; PERINEURAL at 04:09

## 2024-09-05 RX ADMIN — BUPIVACAINE HYDROCHLORIDE 10 ML: 2.5 INJECTION, SOLUTION EPIDURAL; INFILTRATION; INTRACAUDAL; PERINEURAL at 07:09

## 2024-09-05 RX ADMIN — Medication 12 ML/HR: at 07:09

## 2024-09-05 RX ADMIN — SODIUM CHLORIDE, POTASSIUM CHLORIDE, SODIUM LACTATE AND CALCIUM CHLORIDE 1000 ML: 600; 310; 30; 20 INJECTION, SOLUTION INTRAVENOUS at 07:09

## 2024-09-05 RX ADMIN — EPHEDRINE SULFATE 10 MG: 50 INJECTION INTRAVENOUS at 07:09

## 2024-09-05 RX ADMIN — LIDOCAINE HYDROCHLORIDE 2 ML: 20 INJECTION, SOLUTION EPIDURAL; INFILTRATION; INTRACAUDAL; PERINEURAL at 04:09

## 2024-09-05 RX ADMIN — ACETAMINOPHEN 1000 MG: 10 INJECTION, SOLUTION INTRAVENOUS at 04:09

## 2024-09-05 RX ADMIN — Medication 95 MILLI-UNITS/MIN: at 08:09

## 2024-09-05 RX ADMIN — Medication 2 MILLI-UNITS/MIN: at 08:09

## 2024-09-05 RX ADMIN — KETOROLAC TROMETHAMINE 30 MG: 30 INJECTION, SOLUTION INTRAMUSCULAR; INTRAVENOUS at 04:09

## 2024-09-05 RX ADMIN — OXYTOCIN 30 UNITS: 10 INJECTION, SOLUTION INTRAMUSCULAR; INTRAVENOUS at 04:09

## 2024-09-05 RX ADMIN — MEPERIDINE HYDROCHLORIDE 25 MG: 25 INJECTION INTRAMUSCULAR; INTRAVENOUS; SUBCUTANEOUS at 12:09

## 2024-09-05 RX ADMIN — Medication 12 ML/HR: at 02:09

## 2024-09-05 RX ADMIN — ONDANSETRON 4 MG: 2 INJECTION INTRAMUSCULAR; INTRAVENOUS at 04:09

## 2024-09-05 RX ADMIN — SODIUM CHLORIDE, POTASSIUM CHLORIDE, SODIUM LACTATE AND CALCIUM CHLORIDE: 600; 310; 30; 20 INJECTION, SOLUTION INTRAVENOUS at 04:09

## 2024-09-05 RX ADMIN — BUPIVACAINE HYDROCHLORIDE 5 ML: 2.5 INJECTION, SOLUTION EPIDURAL; INFILTRATION; INTRACAUDAL; PERINEURAL at 02:09

## 2024-09-05 RX ADMIN — CEFAZOLIN SODIUM 2 G: 2 SOLUTION INTRAVENOUS at 04:09

## 2024-09-05 RX ADMIN — FENTANYL CITRATE 100 MCG: 50 INJECTION, SOLUTION INTRAMUSCULAR; INTRAVENOUS at 04:09

## 2024-09-05 RX ADMIN — KETOROLAC TROMETHAMINE 30 MG: 30 INJECTION, SOLUTION INTRAMUSCULAR at 10:09

## 2024-09-05 NOTE — PROGRESS NOTES
Assumed care. Pt standing at bedside states she is ready to get up to shower. Efm and toco removed. O2 sat monitor removed. Iv disconnected and locked. Protective barrier placed over clean, dry and intact IV. Family at bedside to assist with shower.

## 2024-09-05 NOTE — ANESTHESIA PREPROCEDURE EVALUATION
09/05/2024  Kennedi Adrian is a 29 y.o., female with IUP in labor for labor epidural.      Pre-op Assessment    I have reviewed the Patient Summary Reports.     I have reviewed the Nursing Notes. I have reviewed the NPO Status.   I have reviewed the Medications.     Review of Systems  Anesthesia Hx:  No problems with previous Anesthesia             Denies Family Hx of Anesthesia complications.    Denies Personal Hx of Anesthesia complications.                    Cardiovascular:  Cardiovascular Normal Exercise tolerance: good          Denies Angina.                                  Pulmonary:    Asthma   Denies Shortness of breath.                      Physical Exam  General: Well nourished, Cooperative, Alert and Oriented    Airway:  Mallampati: II   Mouth Opening: Normal  TM Distance: Normal  Tongue: Normal  Neck ROM: Normal ROM    Dental:  Intact    Chest/Lungs:  Clear to auscultation, Normal Respiratory Rate    Heart:  Rate: Normal  Rhythm: Regular Rhythm        Anesthesia Plan  Type of Anesthesia, risks & benefits discussed:    Anesthesia Type: Epidural  Informed Consent: Informed consent signed with the Patient and all parties understand the risks and agree with anesthesia plan.  All questions answered.   ASA Score: 2  Day of Surgery Review of History & Physical: H&P Update referred to the surgeon/provider.    Ready For Surgery From Anesthesia Perspective.     .

## 2024-09-05 NOTE — ANESTHESIA PROCEDURE NOTES
Epidural    Patient location during procedure: OB   Reason for block: primary anesthetic   Reason for block: labor analgesia requested by patient and obstetrician  Diagnosis: Labor   Start time: 9/5/2024 7:26 AM  Timeout: 9/5/2024 7:25 AM  End time: 9/5/2024 7:39 AM    Staffing  Performing Provider: Chidi Hassan MD  Authorizing Provider: Chidi Hassan MD    Staffing  Performed by: Chidi Hassan MD  Authorized by: Chidi Hassan MD        Preanesthetic Checklist  Completed: patient identified, IV checked, site marked, risks and benefits discussed, surgical consent, monitors and equipment checked, pre-op evaluation, timeout performed, anesthesia consent given, hand hygiene performed and patient being monitored  Preparation  Patient position: sitting  Prep: ChloraPrep  Patient monitoring: Pulse Ox and Blood Pressure  Reason for block: primary anesthetic   Epidural  Skin Anesthetic: lidocaine 1%  Administration type: continuous  Approach: midline  Interspace: L3-4    Injection technique: NII air  Needle and Epidural Catheter  Needle type: Tuohy   Needle gauge: 17    Additional Documentation: incremental injection and negative aspiration for heme and CSF  Needle localization: anatomical landmarks  Assessment  Ease of block: easy  Patient's tolerance of the procedure: no complaints No inadvertent dural puncture with Tuohy.  Dural puncture not performed with spinal needle    Medications:    Medications: bupivacaine (pf) (MARCAINE) injection 0.25% - Epidural   10 mL - 9/5/2024 7:39:00 AM

## 2024-09-05 NOTE — TRANSFER OF CARE
"Anesthesia Transfer of Care Note    Patient: Kennedi Adrian    Procedure(s) Performed: * No procedures listed *    Patient location: Labor and Delivery    Anesthesia Type: epidural    Transport from OR: Transported from OR on room air with adequate spontaneous ventilation    Post pain: adequate analgesia    Post assessment: no apparent anesthetic complications and tolerated procedure well    Post vital signs: stable    Level of consciousness: alert, awake and oriented    Nausea/Vomiting: no nausea/vomiting    Complications: none    Transfer of care protocol was followed      Last vitals: Visit Vitals  /65   Pulse 68   Temp 36.5 °C (97.7 °F)   Resp (!) 98   Ht 5' 7" (1.702 m)   Wt 99.8 kg (220 lb)   SpO2 100%   Breastfeeding No   BMI 34.46 kg/m²     "

## 2024-09-05 NOTE — PROGRESS NOTES
[facility]                       [address] [address]    PATIENT NAME:       FAREED WALTON  YOB: 1995  CSN:                [id number]   MRN:                [id number]  ADMIT DATE:         09/04/2024 04:20:00  PHYSICIAN:          Alberto Chu Jr, MD                            PROGRESS NOTE    DATE:      A 29-year-old primigravida admitted to obstetric unit for induction secondary to   presumed macrosomia at 38 and 4.  The patient underwent Cytotec cervical   ripening as well as Cervidil cervical ripening last a.m.  The patient is in   pain, complaining of contractions, uncomfortable, in pain.  Cervical exam   revealed the patient to have the cervical dilatation approximately 1 cm external   os.  The internal os was closed.  Unable to poke through to have rupture of   membranes.  The heart tones are category 1.  Tocometer showed contractions q.2   and regular.    PLAN:  Continue induction.        ______________________________  Alberto Chu Jr, MD    DJE/AQS  DD:  09/05/2024  Time:  07:04AM  DT:  09/05/2024  Time:  09:29AM  Job #:  [id number]      PROGRESS NOTE

## 2024-09-06 PROBLEM — Z34.90 ENCOUNTER FOR INDUCTION OF LABOR: Status: ACTIVE | Noted: 2024-09-06

## 2024-09-06 LAB
BASOPHILS # BLD AUTO: 0.02 X10(3)/MCL
BASOPHILS NFR BLD AUTO: 0.2 %
EOSINOPHIL # BLD AUTO: 0.03 X10(3)/MCL (ref 0–0.9)
EOSINOPHIL NFR BLD AUTO: 0.3 %
ERYTHROCYTE [DISTWIDTH] IN BLOOD BY AUTOMATED COUNT: 14.1 % (ref 11.5–17)
HCT VFR BLD AUTO: 24.2 % (ref 37–47)
HGB BLD-MCNC: 7.5 G/DL (ref 12–16)
IMM GRANULOCYTES # BLD AUTO: 0.06 X10(3)/MCL (ref 0–0.04)
IMM GRANULOCYTES NFR BLD AUTO: 0.6 %
LYMPHOCYTES # BLD AUTO: 1.45 X10(3)/MCL (ref 0.6–4.6)
LYMPHOCYTES NFR BLD AUTO: 14.8 %
MCH RBC QN AUTO: 24.7 PG (ref 27–31)
MCHC RBC AUTO-ENTMCNC: 31 G/DL (ref 33–36)
MCV RBC AUTO: 79.6 FL (ref 80–94)
MONOCYTES # BLD AUTO: 1.05 X10(3)/MCL (ref 0.1–1.3)
MONOCYTES NFR BLD AUTO: 10.7 %
NEUTROPHILS # BLD AUTO: 7.19 X10(3)/MCL (ref 2.1–9.2)
NEUTROPHILS NFR BLD AUTO: 73.4 %
NRBC BLD AUTO-RTO: 0 %
PLATELET # BLD AUTO: 140 X10(3)/MCL (ref 130–400)
PMV BLD AUTO: 11.2 FL (ref 7.4–10.4)
RBC # BLD AUTO: 3.04 X10(6)/MCL (ref 4.2–5.4)
WBC # BLD AUTO: 9.8 X10(3)/MCL (ref 4.5–11.5)

## 2024-09-06 PROCEDURE — 11000001 HC ACUTE MED/SURG PRIVATE ROOM

## 2024-09-06 PROCEDURE — 85025 COMPLETE CBC W/AUTO DIFF WBC: CPT | Performed by: OBSTETRICS & GYNECOLOGY

## 2024-09-06 PROCEDURE — 63600175 PHARM REV CODE 636 W HCPCS: Performed by: OBSTETRICS & GYNECOLOGY

## 2024-09-06 PROCEDURE — 25000003 PHARM REV CODE 250: Performed by: OBSTETRICS & GYNECOLOGY

## 2024-09-06 PROCEDURE — 36415 COLL VENOUS BLD VENIPUNCTURE: CPT | Performed by: OBSTETRICS & GYNECOLOGY

## 2024-09-06 RX ORDER — OXYCODONE AND ACETAMINOPHEN 10; 325 MG/1; MG/1
1 TABLET ORAL EVERY 6 HOURS PRN
Qty: 28 TABLET | Refills: 0 | Status: SHIPPED | OUTPATIENT
Start: 2024-09-06

## 2024-09-06 RX ADMIN — OXYCODONE AND ACETAMINOPHEN 1 TABLET: 10; 325 TABLET ORAL at 05:09

## 2024-09-06 RX ADMIN — KETOROLAC TROMETHAMINE 30 MG: 30 INJECTION, SOLUTION INTRAMUSCULAR at 04:09

## 2024-09-06 RX ADMIN — IBUPROFEN 600 MG: 600 TABLET, FILM COATED ORAL at 10:09

## 2024-09-06 RX ADMIN — IBUPROFEN 600 MG: 600 TABLET, FILM COATED ORAL at 04:09

## 2024-09-06 RX ADMIN — OXYCODONE AND ACETAMINOPHEN 1 TABLET: 10; 325 TABLET ORAL at 06:09

## 2024-09-06 RX ADMIN — SIMETHICONE 80 MG: 80 TABLET, CHEWABLE ORAL at 08:09

## 2024-09-06 RX ADMIN — OXYCODONE AND ACETAMINOPHEN 1 TABLET: 10; 325 TABLET ORAL at 11:09

## 2024-09-06 RX ADMIN — DOCUSATE SODIUM 200 MG: 100 CAPSULE, LIQUID FILLED ORAL at 10:09

## 2024-09-06 RX ADMIN — KETOROLAC TROMETHAMINE 30 MG: 30 INJECTION, SOLUTION INTRAMUSCULAR at 10:09

## 2024-09-06 RX ADMIN — OXYCODONE AND ACETAMINOPHEN 1 TABLET: 10; 325 TABLET ORAL at 02:09

## 2024-09-06 RX ADMIN — OXYCODONE AND ACETAMINOPHEN 1 TABLET: 10; 325 TABLET ORAL at 08:09

## 2024-09-06 RX ADMIN — DOCUSATE SODIUM 200 MG: 100 CAPSULE, LIQUID FILLED ORAL at 08:09

## 2024-09-06 NOTE — PLAN OF CARE
Problem: Adult Inpatient Plan of Care  Goal: Plan of Care Review  2024 by Drea Ferrara LPN  Outcome: Progressing  2024 by Drea Ferrara LPN  Outcome: Progressing  Goal: Patient-Specific Goal (Individualized)  2024 by Drea Ferrara LPN  Outcome: Progressing  2024 by Drea Ferrara LPN  Outcome: Progressing  Goal: Absence of Hospital-Acquired Illness or Injury  2024 by Drea Ferrara LPN  Outcome: Progressing  2024 by Drea Ferrara LPN  Outcome: Progressing  Goal: Optimal Comfort and Wellbeing  2024 by Drea Ferrara LPN  Outcome: Progressing  2024 by Drea Ferrara LPN  Outcome: Progressing  Goal: Readiness for Transition of Care  2024 by Drea Ferrara LPN  Outcome: Progressing  2024 by Drea Ferrara LPN  Outcome: Progressing     Problem:  Fall Injury Risk  Goal: Absence of Fall, Infant Drop and Related Injury  2024 by Drea Ferrara LPN  Outcome: Progressing  2024 by Drea Ferrara LPN  Outcome: Progressing     Problem:   Goal: Optimal Circumcision Site Healing  2024 by Drea Ferrara LPN  Outcome: Progressing  2024 by Drea Ferrara LPN  Outcome: Progressing  Goal: Glucose Stability  2024 by Drea Ferrara LPN  Outcome: Progressing  2024 by Drea Ferrara LPN  Outcome: Progressing  Goal: Demonstration of Attachment Behaviors  2024 by Drea Ferrara LPN  Outcome: Progressing  2024 by Drea Ferrara LPN  Outcome: Progressing  Goal: Absence of Infection Signs and Symptoms  2024 by Drea Ferrara LPN  Outcome: Progressing  2024 by Drea Ferrara LPN  Outcome: Progressing  Goal: Effective Oral Intake  2024 by Drea Ferrara LPN  Outcome: Progressing  2024 by Drea Ferrara LPN  Outcome: Progressing  Goal: Optimal Level of Comfort and Activity  2024 by Drea Ferrara LPN  Outcome:  Progressing  9/5/2024 2125 by Drea Ferrara LPN  Outcome: Progressing  Goal: Effective Oxygenation and Ventilation  9/5/2024 2127 by Drea Ferrara LPN  Outcome: Progressing  9/5/2024 2125 by Drea Ferrara LPN  Outcome: Progressing  Goal: Skin Health and Integrity  9/5/2024 2127 by Drea Ferrara LPN  Outcome: Progressing  9/5/2024 2125 by Drea Ferrara LPN  Outcome: Progressing  Goal: Temperature Stability  9/5/2024 2127 by Drea Ferrara LPN  Outcome: Progressing  9/5/2024 2125 by Drea Ferrara LPN  Outcome: Progressing     Problem: Breastfeeding  Goal: Effective Breastfeeding  9/5/2024 2127 by Drea Ferrara LPN  Outcome: Progressing  9/5/2024 2125 by Drea Ferrara LPN  Outcome: Progressing

## 2024-09-06 NOTE — PLAN OF CARE
Problem: Adult Inpatient Plan of Care  Goal: Plan of Care Review  Outcome: Progressing  Goal: Patient-Specific Goal (Individualized)  Outcome: Progressing  Goal: Absence of Hospital-Acquired Illness or Injury  Outcome: Progressing  Goal: Optimal Comfort and Wellbeing  Outcome: Progressing  Goal: Readiness for Transition of Care  Outcome: Progressing     Problem:  Fall Injury Risk  Goal: Absence of Fall, Infant Drop and Related Injury  Outcome: Progressing     Problem: Infection  Goal: Absence of Infection Signs and Symptoms  Outcome: Progressing     Problem: Breastfeeding  Goal: Effective Breastfeeding  Outcome: Progressing

## 2024-09-06 NOTE — OP NOTE
[facility]                       [address] [address]    PATIENT NAME:      FAREED WALTON  YOB: 1995  CSN:               [id number]  MRN:               [id number]  ADMIT DATE:        2024 04:20:00  PHYSICIAN:         Alberto Chu Jr, MD                          OPERATIVE REPORT      DATE OF SURGERY:    2024 00:00:00    SURGEON:  Alberto Chu Jr, MD    TITLE OF OPERATION:  Primary .    DESCRIPTION OF PROCEDURE:  Patient was taken operating room where epidural   anesthesia was checked and found to be adequate.  Abdomen, perineum, and vagina   were prepped and draped in usual sterile fashion.  A Pfannenstiel skin incision   made with a knife and carried down through to the level of the underlying   fascia.  The fascial incision was then extended at the curved Dunn scissors.    Superior aspect of the rectus fascia was grasped with Kocher clamps and   dissected off the rectus muscles both superiorly and inferiorly.  The rectus   muscles were  in midline.  The peritoneum identified and entered   sharply.  Peritoneal incision then extended with both sharp and blunt   dissection.  The bladder blade was inserted.  Vesicouterine was identified and a   bladder flap was created digitally.  Uterus was scored in a low transverse   fashion, was carried out the finger method.  Hand was inserted into the uterus.    The infant's head without incident, rest of the infant was delivered in full.    It was handed off to awaiting [facility] nurses.  Placenta was delivered manually.    Uterus exteriorized cleared of clots and debris.  Hysterotomy site was closed in   2 layers with #1 chromic.  Excellent hemostasis noted.  Uterus returned to the   abdomen.  Gutters were cleared of clots and debris.  Hemostasis was ensured   throughout.  The peritoneum was run with a 2-0 chromic, rectus muscles    reapproximated in midline, the fascia with 1 Vicryl subcuticular tissue with 2-0   plain gut, skin was closed with an Insorb.  The patient tolerated the procedure   well.    COUNT:  Needle, sponge, lap counts were correct x2.    BLOOD LOSS:  340.      ______________________________  MD GILDA Castaneda Jr/GORAN  DD:  09/06/2024  Time:  08:24AM  DT:  09/06/2024  Time:  08:33AM  Job #:  [id number]      OPERATIVE REPORT

## 2024-09-06 NOTE — PLAN OF CARE
Problem: Breastfeeding  Goal: Effective Breastfeeding  Outcome: Progressing  Intervention: Promote Breast Care and Comfort  Flowsheets (Taken 9/5/2024 2330)  Breast Care: Breastfeeding: open to air  Breast Pumping: hand expression utilized  Intervention: Promote Effective Breastfeeding  Flowsheets (Taken 9/5/2024 2330)  Breastfeeding Assistance:   assisted with positioning   feeding cue recognition promoted   feeding on demand promoted   feeding session observed   infant suck/swallow verified   support offered   infant latch-on verified   hand expression verified  Parent-Child Attachment Promotion:   cue recognition promoted   positive reinforcement provided   skin-to-skin contact encouraged  Intervention: Support Exclusive Breastfeeding Success  Flowsheets (Taken 9/5/2024 2330)  Supportive Measures:   active listening utilized   counseling provided   positive reinforcement provided  Breastfeeding Support:   lactation counseling provided   encouragement provided

## 2024-09-07 PROCEDURE — 11000001 HC ACUTE MED/SURG PRIVATE ROOM

## 2024-09-07 PROCEDURE — 25000003 PHARM REV CODE 250: Performed by: OBSTETRICS & GYNECOLOGY

## 2024-09-07 RX ORDER — OXYCODONE AND ACETAMINOPHEN 5; 325 MG/1; MG/1
1 TABLET ORAL EVERY 4 HOURS PRN
Status: DISCONTINUED | OUTPATIENT
Start: 2024-09-07 | End: 2024-09-08 | Stop reason: HOSPADM

## 2024-09-07 RX ORDER — OXYCODONE AND ACETAMINOPHEN 10; 325 MG/1; MG/1
1 TABLET ORAL EVERY 4 HOURS PRN
Status: DISCONTINUED | OUTPATIENT
Start: 2024-09-07 | End: 2024-09-08 | Stop reason: HOSPADM

## 2024-09-07 RX ADMIN — OXYCODONE AND ACETAMINOPHEN 1 TABLET: 10; 325 TABLET ORAL at 08:09

## 2024-09-07 RX ADMIN — IBUPROFEN 600 MG: 600 TABLET, FILM COATED ORAL at 10:09

## 2024-09-07 RX ADMIN — OXYCODONE AND ACETAMINOPHEN 1 TABLET: 10; 325 TABLET ORAL at 12:09

## 2024-09-07 RX ADMIN — OXYCODONE HYDROCHLORIDE AND ACETAMINOPHEN 1 TABLET: 10; 325 TABLET ORAL at 05:09

## 2024-09-07 RX ADMIN — OXYCODONE HYDROCHLORIDE AND ACETAMINOPHEN 1 TABLET: 10; 325 TABLET ORAL at 10:09

## 2024-09-07 RX ADMIN — OXYCODONE AND ACETAMINOPHEN 1 TABLET: 10; 325 TABLET ORAL at 05:09

## 2024-09-07 RX ADMIN — DOCUSATE SODIUM 200 MG: 100 CAPSULE, LIQUID FILLED ORAL at 08:09

## 2024-09-07 RX ADMIN — IBUPROFEN 600 MG: 600 TABLET, FILM COATED ORAL at 03:09

## 2024-09-07 RX ADMIN — IBUPROFEN 600 MG: 600 TABLET, FILM COATED ORAL at 04:09

## 2024-09-07 NOTE — DISCHARGE SUMMARY
[facility]                       [address] [address]    PATIENT NAME:       FAREED WALTON  YOB: 1995  CSN:                [id number]   MRN:                [id number]  ADMIT DATE:         2024 04:20:00  PHYSICIAN:          Alberto Chu Jr, MD                          DISCHARGE SUMMARY    DATE OF DISCHARGE:      HOSPITAL COURSE:  A 29-year-old,  1, para 1, status post primary    section, was admitted to the postpartum GYN service, where she has had an   uneventful and speedy recovery.  She is ambulating, tolerating a regular diet.    Her vitals are stable.  She is afebrile.  She had normal bowel and bladder   function.  She will be discharged home on postpartum day #2.    CONDITION:  Stable.    DIET:  Regular.    ACTIVITY:  Pelvic rest.    DISCHARGE MEDICATIONS:    1. Percocet p.r.n.  2. Motrin p.r.n.    FOLLOWUP:  Followup with Dr. Chu in 3 weeks.        ______________________________  Alberto Chu Jr, MD    DJE/AQS  DD:  2024  Time:  11:17AM  DT:  2024  Time:  11:55AM  Job #:  [id number]      DISCHARGE SUMMARY

## 2024-09-07 NOTE — PLAN OF CARE
"  Problem: Adult Inpatient Plan of Care  Goal: Plan of Care Review  Outcome: Progressing  Goal: Patient-Specific Goal (Individualized)  Description: "I want to breastfeed my baby"  Outcome: Progressing  Goal: Absence of Hospital-Acquired Illness or Injury  Outcome: Progressing  Goal: Optimal Comfort and Wellbeing  Outcome: Progressing  Goal: Readiness for Transition of Care  Outcome: Progressing     Problem:  Fall Injury Risk  Goal: Absence of Fall, Infant Drop and Related Injury  Outcome: Progressing     Problem: Infection  Goal: Absence of Infection Signs and Symptoms  Outcome: Progressing     Problem: Labor  Goal: Hemostasis  Outcome: Progressing  Goal: Stable Fetal Wellbeing  Outcome: Progressing  Goal: Effective Progression to Delivery  Outcome: Progressing  Goal: Absence of Infection Signs and Symptoms  Outcome: Progressing  Goal: Acceptable Pain Control  Outcome: Progressing  Goal: Normal Uterine Contraction Pattern  Outcome: Progressing     Problem: [place]  Goal: Optimal Circumcision Site Healing  Outcome: Progressing  Goal: Glucose Stability  Outcome: Progressing  Goal: Demonstration of Attachment Behaviors  Outcome: Progressing  Goal: Absence of Infection Signs and Symptoms  Outcome: Progressing  Goal: Effective Oral Intake  Outcome: Progressing  Goal: Optimal Level of Comfort and Activity  Outcome: Progressing  Goal: Effective Oxygenation and Ventilation  Outcome: Progressing  Goal: Skin Health and Integrity  Outcome: Progressing  Goal: Temperature Stability  Outcome: Progressing     Problem: Breastfeeding  Goal: Effective Breastfeeding  Outcome: Progressing     Problem: Breastfeeding  Goal: Effective Breastfeeding  Outcome: Progressing     "

## 2024-09-07 NOTE — PLAN OF CARE
Problem: Breastfeeding  Goal: Effective Breastfeeding  Outcome: Progressing  Intervention: Promote Effective Breastfeeding  Flowsheets (Taken 9/7/2024 1445)  Breastfeeding Assistance:   feeding cue recognition promoted   feeding on demand promoted   support offered  Parent-Child Attachment Promotion:   skin-to-skin contact encouraged   positive reinforcement provided  Intervention: Support Exclusive Breastfeeding Success  Flowsheets (Taken 9/7/2024 1445)  Supportive Measures:   active listening utilized   counseling provided   goal-setting facilitated   self-care encouraged  Breastfeeding Support:   diary/feeding log utilized   encouragement provided   lactation counseling provided   maternal rest encouraged    1445-- Nurse currently has baby in nurses' station to promote mother resting. All lights off in room. No latch observed. Mother states she feels breastfeeding is going well. Hears swallows with rhythmic sucking. No visible damage seen to nipples. Right nipple is slightly reddened but left is WNL. Encouraged use of nipple butter. Call for next feeding to observe.

## 2024-09-07 NOTE — DISCHARGE INSTRUCTIONS
[facility]   [phone]   Discharge Instructions      Feed baby when you notice early hunger cues, such as rooting, hand to mouth, smacking lips, sticking out tongue.  Crying is a late sign of hunger. Try to get baby to the breast before crying begins. (page 2 & 39 of booklet)      Most newborns need to eat at least 8 times in a 24-hour period. Feeding often will help develop milk supply.  Feed baby anytime hunger cues are noticed, and wake baby if needed to ensure 8 or more feeds per 24 hour period. (pg. 24)      Cluster feeding is normal: baby may nurse very often for several times in a row.  This commonly occurs in the evening or early part of the night. (pg. 4)       Allow your baby to finish one side before offering the other. You can try to burp baby and then offer the other breast if he/she seems to still be hungry.       Skin to skin contact can help a sleepy baby want to nurse. Babies held skin to skin, often nurse better and longer. Skin to skin increases moms milk-making hormone levels as well. Skin to skin is calming for mom and baby. (pg. 23)      In the early days, the number of wet and dirty diapers baby has each day can help you know if baby is getting enough to eat.  Refer to your breastfeeding booklet (pgs. 2-12) to see how many wet/dirty diapers baby should be having each day.  Contact babys pediatrician or lactation, if baby is not having enough wet and dirty diapers.      It is best to avoid pacifiers and bottles for the first 4 weeks, while getting breastfeeding established.        Back to work or school:  4 weeks is a good time to start pumping after morning feeds, in order to store milk for baby. Although you may pump before if needed. Week 4 is also a good time to introduce a bottle of pumped milk to baby, if you will be going back to work or school.  This can be done sooner if needed. (Refer to pgs 25-27 for pumping and milk storage)       When baby is latched deeply to your  breast, you should feel a strong tugging or pulling sensation. If your nipples are sore, you may feel mild discomfort with initial latch that eases quickly.  If there is pain, try to adjust the latch. Make sure your baby opens his mouth wide to latch on. Scan the QR code on page 18 for a video on latching.       Listen for swallowing when baby is nursing. This indicates your baby is transferring that milk!      Your milk will increase between days 3-5.  Frequent feeds can help prevent engorgement.      If your breasts begin to get engorged, refer to pages 20 & 21 for tips on management.  Feed often to help keep your breasts comfortable. If baby is not able to remove milk from your breasts, pumping will be needed to relieve breast fullness and build milk supply. If baby is removing milk well from your breasts, but they are still uncomfortably full after, You may need to pump for comfort, meaning just pump enough to relieve the fullness.      No soap or lotions to the nipples except for medical grade lanolin or nipple cream for soreness.        All babies go through growth spurts. The first one is generally around 2-3 weeks. If your baby starts to nurse a lot more than usual, this is likely the reason.  Growth spurts happen every so often, and usually lasts for 3-5 days.      Remember to check the safety of any medications, prescription or non-prescription, and herbals, before you take them.  Your babys pediatrician is the best one to confirm the safety of the medication while you are breastfeeding. You may also the lactation department, or the [facility] at [phone], to check the safety of a medication. (pg 31)      Call with any questions or concerns. Dont wait --ask for help early. Breastfeeding Resources can be found on pages 33-35 of your Breastfeeding Booklet given to you in the hospital.

## 2024-09-08 VITALS
BODY MASS INDEX: 34.53 KG/M2 | OXYGEN SATURATION: 98 % | TEMPERATURE: 98 F | HEART RATE: 72 BPM | WEIGHT: 220 LBS | DIASTOLIC BLOOD PRESSURE: 66 MMHG | HEIGHT: 67 IN | SYSTOLIC BLOOD PRESSURE: 111 MMHG | RESPIRATION RATE: 18 BRPM

## 2024-09-08 PROCEDURE — 25000003 PHARM REV CODE 250: Performed by: OBSTETRICS & GYNECOLOGY

## 2024-09-08 RX ADMIN — DOCUSATE SODIUM 200 MG: 100 CAPSULE, LIQUID FILLED ORAL at 08:09

## 2024-09-08 RX ADMIN — OXYCODONE HYDROCHLORIDE AND ACETAMINOPHEN 1 TABLET: 10; 325 TABLET ORAL at 08:09

## 2024-09-08 RX ADMIN — IBUPROFEN 600 MG: 600 TABLET, FILM COATED ORAL at 12:09

## 2024-09-08 RX ADMIN — OXYCODONE HYDROCHLORIDE AND ACETAMINOPHEN 1 TABLET: 10; 325 TABLET ORAL at 01:09

## 2024-09-08 RX ADMIN — IBUPROFEN 600 MG: 600 TABLET, FILM COATED ORAL at 06:09

## 2024-09-08 RX ADMIN — OXYCODONE HYDROCHLORIDE AND ACETAMINOPHEN 1 TABLET: 10; 325 TABLET ORAL at 03:09

## 2024-09-08 NOTE — PLAN OF CARE
"  Problem: Adult Inpatient Plan of Care  Goal: Plan of Care Review  Outcome: Progressing  Goal: Patient-Specific Goal (Individualized)  Description: "I want to breastfeed my baby"  Outcome: Progressing  Goal: Absence of Hospital-Acquired Illness or Injury  Outcome: Progressing  Goal: Optimal Comfort and Wellbeing  Outcome: Progressing  Goal: Readiness for Transition of Care  Outcome: Progressing     Problem:  Fall Injury Risk  Goal: Absence of Fall, Infant Drop and Related Injury  Outcome: Progressing     Problem: Infection  Goal: Absence of Infection Signs and Symptoms  Outcome: Progressing     Problem: Labor  Goal: Hemostasis  Outcome: Progressing  Goal: Stable Fetal Wellbeing  Outcome: Progressing  Goal: Effective Progression to Delivery  Outcome: Progressing  Goal: Absence of Infection Signs and Symptoms  Outcome: Progressing  Goal: Acceptable Pain Control  Outcome: Progressing  Goal: Normal Uterine Contraction Pattern  Outcome: Progressing     Problem: Breastfeeding  Goal: Effective Breastfeeding  Outcome: Progressing     Problem: Breastfeeding  Goal: Effective Breastfeeding  Outcome: Progressing     "

## 2024-09-13 NOTE — ANESTHESIA POSTPROCEDURE EVALUATION
Anesthesia Post Evaluation    Patient: Kennedi Adrian    Procedure(s) Performed: * No procedures listed *    Final Anesthesia Type: epidural      Patient location during evaluation: med/surg floor  Patient participation: Yes- Able to Participate  Level of consciousness: awake and alert  Post-procedure vital signs: reviewed and stable  Pain management: adequate  Airway patency: patent      Anesthetic complications: no      Cardiovascular status: hemodynamically stable  Respiratory status: unassisted  Hydration status: euvolemic  Follow-up not needed.              Vitals Value Taken Time   /66 09/08/24 0726   Temp 36.5 °C (97.7 °F) 09/08/24 0726   Pulse 72 09/08/24 0726   Resp 18 09/08/24 1323   SpO2 98 % 09/08/24 0726         No case tracking events are documented in the log.      Pain/Soila Score: No data recorded

## 2024-11-05 ENCOUNTER — ANESTHESIA (OUTPATIENT)
Dept: SURGERY | Facility: HOSPITAL | Age: 29
End: 2024-11-05
Payer: MEDICAID

## 2024-11-05 ENCOUNTER — ANESTHESIA EVENT (OUTPATIENT)
Dept: SURGERY | Facility: HOSPITAL | Age: 29
End: 2024-11-05
Payer: MEDICAID

## 2024-11-05 ENCOUNTER — HOSPITAL ENCOUNTER (OUTPATIENT)
Facility: HOSPITAL | Age: 29
Discharge: HOME HEALTH | End: 2024-11-06
Attending: FAMILY MEDICINE | Admitting: FAMILY MEDICINE
Payer: MEDICAID

## 2024-11-05 DIAGNOSIS — N61.1 BREAST ABSCESS: Primary | ICD-10-CM

## 2024-11-05 DIAGNOSIS — N61.0 MASTITIS IN FEMALE: ICD-10-CM

## 2024-11-05 LAB
ALBUMIN SERPL-MCNC: 4.6 G/DL (ref 3.4–5)
ALBUMIN/GLOB SERPL: 1.4 RATIO
ALP SERPL-CCNC: 83 UNIT/L (ref 50–144)
ALT SERPL-CCNC: 27 UNIT/L (ref 1–45)
ANION GAP SERPL CALC-SCNC: 12 MEQ/L (ref 2–13)
AST SERPL-CCNC: 29 UNIT/L (ref 14–36)
B-HCG UR QL: NEGATIVE
BASOPHILS # BLD AUTO: 0.01 X10(3)/MCL (ref 0.01–0.08)
BASOPHILS NFR BLD AUTO: 0.1 % (ref 0.1–1.2)
BILIRUB SERPL-MCNC: 0.5 MG/DL (ref 0–1)
BUN SERPL-MCNC: 11 MG/DL (ref 7–20)
CALCIUM SERPL-MCNC: 9.4 MG/DL (ref 8.4–10.2)
CHLORIDE SERPL-SCNC: 105 MMOL/L (ref 98–110)
CO2 SERPL-SCNC: 23 MMOL/L (ref 21–32)
CREAT SERPL-MCNC: 0.78 MG/DL (ref 0.66–1.25)
CREAT/UREA NIT SERPL: 14 (ref 12–20)
EOSINOPHIL # BLD AUTO: 0.04 X10(3)/MCL (ref 0.04–0.36)
EOSINOPHIL NFR BLD AUTO: 0.6 % (ref 0.7–7)
ERYTHROCYTE [DISTWIDTH] IN BLOOD BY AUTOMATED COUNT: 16.5 % (ref 11–14.5)
GFR SERPLBLD CREATININE-BSD FMLA CKD-EPI: >90 ML/MIN/1.73/M2
GLOBULIN SER-MCNC: 3.4 GM/DL (ref 2–3.9)
GLUCOSE SERPL-MCNC: 97 MG/DL (ref 70–115)
HCT VFR BLD AUTO: 36.2 % (ref 36–48)
HGB BLD-MCNC: 11.1 G/DL (ref 11.8–16)
IMM GRANULOCYTES # BLD AUTO: 0.01 X10(3)/MCL (ref 0–0.03)
IMM GRANULOCYTES NFR BLD AUTO: 0.1 % (ref 0–0.5)
LACTATE SERPL-SCNC: 1.8 MMOL/L (ref 0.4–2)
LYMPHOCYTES # BLD AUTO: 1.81 X10(3)/MCL (ref 1.16–3.74)
LYMPHOCYTES NFR BLD AUTO: 25.9 % (ref 20–55)
MCH RBC QN AUTO: 23 PG (ref 27–34)
MCHC RBC AUTO-ENTMCNC: 30.7 G/DL (ref 31–37)
MCV RBC AUTO: 75.1 FL (ref 79–99)
MONOCYTES # BLD AUTO: 0.8 X10(3)/MCL (ref 0.24–0.36)
MONOCYTES NFR BLD AUTO: 11.4 % (ref 4.7–12.5)
NEUTROPHILS # BLD AUTO: 4.33 X10(3)/MCL (ref 1.56–6.13)
NEUTROPHILS NFR BLD AUTO: 61.9 % (ref 37–73)
NRBC BLD AUTO-RTO: 0 %
PLATELET # BLD AUTO: 285 X10(3)/MCL (ref 140–371)
PMV BLD AUTO: 10 FL (ref 9.4–12.4)
POTASSIUM SERPL-SCNC: 3.8 MMOL/L (ref 3.5–5.1)
PROT SERPL-MCNC: 8 GM/DL (ref 6.3–8.2)
RBC # BLD AUTO: 4.82 X10(6)/MCL (ref 4–5.1)
SODIUM SERPL-SCNC: 140 MMOL/L (ref 136–145)
WBC # BLD AUTO: 7 X10(3)/MCL (ref 4–11.5)

## 2024-11-05 PROCEDURE — 36000704 HC OR TIME LEV I 1ST 15 MIN: Performed by: SURGERY

## 2024-11-05 PROCEDURE — 71000033 HC RECOVERY, INTIAL HOUR: Performed by: SURGERY

## 2024-11-05 PROCEDURE — 80053 COMPREHEN METABOLIC PANEL: CPT | Performed by: FAMILY MEDICINE

## 2024-11-05 PROCEDURE — 25000003 PHARM REV CODE 250: Performed by: FAMILY MEDICINE

## 2024-11-05 PROCEDURE — 83605 ASSAY OF LACTIC ACID: CPT | Performed by: FAMILY MEDICINE

## 2024-11-05 PROCEDURE — 94761 N-INVAS EAR/PLS OXIMETRY MLT: CPT

## 2024-11-05 PROCEDURE — 85025 COMPLETE CBC W/AUTO DIFF WBC: CPT | Performed by: FAMILY MEDICINE

## 2024-11-05 PROCEDURE — 63600175 PHARM REV CODE 636 W HCPCS: Performed by: FAMILY MEDICINE

## 2024-11-05 PROCEDURE — 25000003 PHARM REV CODE 250: Performed by: REGISTERED NURSE

## 2024-11-05 PROCEDURE — 87070 CULTURE OTHR SPECIMN AEROBIC: CPT | Performed by: SURGERY

## 2024-11-05 PROCEDURE — 25000003 PHARM REV CODE 250: Performed by: SURGERY

## 2024-11-05 PROCEDURE — 63600175 PHARM REV CODE 636 W HCPCS: Performed by: REGISTERED NURSE

## 2024-11-05 PROCEDURE — 37000009 HC ANESTHESIA EA ADD 15 MINS: Performed by: SURGERY

## 2024-11-05 PROCEDURE — G0378 HOSPITAL OBSERVATION PER HR: HCPCS

## 2024-11-05 PROCEDURE — 87205 SMEAR GRAM STAIN: CPT | Performed by: SURGERY

## 2024-11-05 PROCEDURE — 63600175 PHARM REV CODE 636 W HCPCS: Performed by: SURGERY

## 2024-11-05 PROCEDURE — 36000705 HC OR TIME LEV I EA ADD 15 MIN: Performed by: SURGERY

## 2024-11-05 PROCEDURE — 37000008 HC ANESTHESIA 1ST 15 MINUTES: Performed by: SURGERY

## 2024-11-05 PROCEDURE — 81025 URINE PREGNANCY TEST: CPT | Performed by: FAMILY MEDICINE

## 2024-11-05 RX ORDER — PROPOFOL 10 MG/ML
VIAL (ML) INTRAVENOUS
Status: DISCONTINUED | OUTPATIENT
Start: 2024-11-05 | End: 2024-11-05

## 2024-11-05 RX ORDER — ONDANSETRON HYDROCHLORIDE 2 MG/ML
4 INJECTION, SOLUTION INTRAVENOUS EVERY 6 HOURS PRN
Status: DISCONTINUED | OUTPATIENT
Start: 2024-11-05 | End: 2024-11-06 | Stop reason: HOSPADM

## 2024-11-05 RX ORDER — HYDROMORPHONE HYDROCHLORIDE 1 MG/ML
0.5 INJECTION, SOLUTION INTRAMUSCULAR; INTRAVENOUS; SUBCUTANEOUS EVERY 4 HOURS PRN
Status: DISCONTINUED | OUTPATIENT
Start: 2024-11-05 | End: 2024-11-06 | Stop reason: HOSPADM

## 2024-11-05 RX ORDER — MIDAZOLAM HYDROCHLORIDE 1 MG/ML
INJECTION INTRAMUSCULAR; INTRAVENOUS
Status: DISCONTINUED | OUTPATIENT
Start: 2024-11-05 | End: 2024-11-05

## 2024-11-05 RX ORDER — FAMOTIDINE 10 MG/ML
INJECTION INTRAVENOUS
Status: DISCONTINUED | OUTPATIENT
Start: 2024-11-05 | End: 2024-11-05

## 2024-11-05 RX ORDER — KETOROLAC TROMETHAMINE 30 MG/ML
30 INJECTION, SOLUTION INTRAMUSCULAR; INTRAVENOUS EVERY 6 HOURS PRN
Status: DISCONTINUED | OUTPATIENT
Start: 2024-11-05 | End: 2024-11-06 | Stop reason: HOSPADM

## 2024-11-05 RX ORDER — ENOXAPARIN SODIUM 100 MG/ML
40 INJECTION SUBCUTANEOUS EVERY 24 HOURS
Status: DISCONTINUED | OUTPATIENT
Start: 2024-11-05 | End: 2024-11-06 | Stop reason: HOSPADM

## 2024-11-05 RX ORDER — LIDOCAINE HYDROCHLORIDE 20 MG/ML
INJECTION INTRAVENOUS
Status: DISCONTINUED | OUTPATIENT
Start: 2024-11-05 | End: 2024-11-05

## 2024-11-05 RX ORDER — FENTANYL CITRATE 50 UG/ML
INJECTION, SOLUTION INTRAMUSCULAR; INTRAVENOUS
Status: DISCONTINUED | OUTPATIENT
Start: 2024-11-05 | End: 2024-11-05

## 2024-11-05 RX ORDER — ACETAMINOPHEN 500 MG
1000 TABLET ORAL
Status: COMPLETED | OUTPATIENT
Start: 2024-11-05 | End: 2024-11-05

## 2024-11-05 RX ORDER — DEXAMETHASONE SODIUM PHOSPHATE 4 MG/ML
INJECTION, SOLUTION INTRA-ARTICULAR; INTRALESIONAL; INTRAMUSCULAR; INTRAVENOUS; SOFT TISSUE
Status: DISCONTINUED | OUTPATIENT
Start: 2024-11-05 | End: 2024-11-05

## 2024-11-05 RX ORDER — AMPICILLIN AND SULBACTAM 2; 1 G/1; G/1
3 INJECTION, POWDER, FOR SOLUTION INTRAMUSCULAR; INTRAVENOUS
Status: DISCONTINUED | OUTPATIENT
Start: 2024-11-05 | End: 2024-11-05

## 2024-11-05 RX ORDER — ACETAMINOPHEN 500 MG
1000 TABLET ORAL EVERY 6 HOURS PRN
Status: DISCONTINUED | OUTPATIENT
Start: 2024-11-05 | End: 2024-11-06 | Stop reason: HOSPADM

## 2024-11-05 RX ORDER — GLYCOPYRROLATE 0.2 MG/ML
INJECTION INTRAMUSCULAR; INTRAVENOUS
Status: DISCONTINUED | OUTPATIENT
Start: 2024-11-05 | End: 2024-11-05

## 2024-11-05 RX ORDER — LIDOCAINE HYDROCHLORIDE AND EPINEPHRINE 10; 10 MG/ML; UG/ML
INJECTION, SOLUTION INFILTRATION; PERINEURAL
Status: DISCONTINUED | OUTPATIENT
Start: 2024-11-05 | End: 2024-11-05 | Stop reason: HOSPADM

## 2024-11-05 RX ORDER — SODIUM CHLORIDE 0.9 % (FLUSH) 0.9 %
3 SYRINGE (ML) INJECTION EVERY 6 HOURS PRN
Status: DISCONTINUED | OUTPATIENT
Start: 2024-11-05 | End: 2024-11-06 | Stop reason: HOSPADM

## 2024-11-05 RX ADMIN — AMPICILLIN AND SULBACTAM 3 G: 2; 1 INJECTION, POWDER, FOR SOLUTION INTRAVENOUS at 06:11

## 2024-11-05 RX ADMIN — GLYCOPYRROLATE 0.2 MG: 0.2 INJECTION INTRAMUSCULAR; INTRAVENOUS at 05:11

## 2024-11-05 RX ADMIN — PROPOFOL 150 MG: 10 INJECTION, EMULSION INTRAVENOUS at 05:11

## 2024-11-05 RX ADMIN — DEXAMETHASONE SODIUM PHOSPHATE 8 MG: 4 INJECTION, SOLUTION INTRA-ARTICULAR; INTRALESIONAL; INTRAMUSCULAR; INTRAVENOUS; SOFT TISSUE at 05:11

## 2024-11-05 RX ADMIN — AMPICILLIN AND SULBACTAM 3 G: 2; 1 INJECTION, POWDER, FOR SOLUTION INTRAVENOUS at 11:11

## 2024-11-05 RX ADMIN — FENTANYL CITRATE 50 MCG: 50 INJECTION, SOLUTION INTRAMUSCULAR; INTRAVENOUS at 05:11

## 2024-11-05 RX ADMIN — HYDROMORPHONE HYDROCHLORIDE 0.5 MG: 1 INJECTION, SOLUTION INTRAMUSCULAR; INTRAVENOUS; SUBCUTANEOUS at 10:11

## 2024-11-05 RX ADMIN — AMPICILLIN SODIUM AND SULBACTAM SODIUM 3 G: 2; 1 INJECTION, POWDER, FOR SOLUTION INTRAMUSCULAR; INTRAVENOUS at 01:11

## 2024-11-05 RX ADMIN — MIDAZOLAM HYDROCHLORIDE 2 MG: 1 INJECTION, SOLUTION INTRAMUSCULAR; INTRAVENOUS at 05:11

## 2024-11-05 RX ADMIN — LIDOCAINE HYDROCHLORIDE 40 MG: 20 INJECTION, SOLUTION INTRAVENOUS at 05:11

## 2024-11-05 RX ADMIN — HYDROMORPHONE HYDROCHLORIDE 0.5 MG: 1 INJECTION, SOLUTION INTRAMUSCULAR; INTRAVENOUS; SUBCUTANEOUS at 05:11

## 2024-11-05 RX ADMIN — KETOROLAC TROMETHAMINE 30 MG: 30 INJECTION, SOLUTION INTRAMUSCULAR at 04:11

## 2024-11-05 RX ADMIN — FAMOTIDINE 20 MG: 10 INJECTION INTRAVENOUS at 05:11

## 2024-11-05 RX ADMIN — ONDANSETRON 4 MG: 2 INJECTION INTRAMUSCULAR; INTRAVENOUS at 05:11

## 2024-11-05 RX ADMIN — ACETAMINOPHEN 1000 MG: 500 TABLET ORAL at 11:11

## 2024-11-05 NOTE — ANESTHESIA POSTPROCEDURE EVALUATION
Anesthesia Post Evaluation    Patient: Kennedi Adrian    Procedure(s) Performed: Procedure(s) (LRB):  INCISION AND DRAINAGE, ABSCESS (Left)    Final Anesthesia Type: general      Patient location during evaluation: PACU  Patient participation: Yes- Able to Participate  Level of consciousness: awake and alert and oriented  Post-procedure vital signs: reviewed and stable  Pain management: adequate  Airway patency: patent  ARMANDO mitigation strategies: Multimodal analgesia  PONV status at discharge: No PONV  Anesthetic complications: no      Cardiovascular status: blood pressure returned to baseline and hemodynamically stable  Respiratory status: unassisted, spontaneous ventilation and room air  Hydration status: euvolemic  Follow-up not needed.              Vitals Value Taken Time   /87 11/05/24 1739   Temp 36.6 °C (97.9 °F) 11/05/24 1739   Pulse 73 11/05/24 1742   Resp 16 11/05/24 1739   SpO2 97 % 11/05/24 1742   Vitals shown include unfiled device data.      No case tracking events are documented in the log.      Pain/Soila Score: Pain Rating Prior to Med Admin: 8 (11/5/2024  4:24 PM)  Pain Rating Post Med Admin: 5 (11/5/2024 12:31 PM)  Soila Score: 9 (11/5/2024  5:39 PM)

## 2024-11-05 NOTE — OP NOTE
Procedure date: 11/05/2024     Indications:  29-year-old white female admitted through the emergency department with large inflamed left breast and associated abscess related to breastfeeding.  Patient is recently postpartum     Preoperative diagnosis: Left breast abscess   Postoperative diagnosis: Left breast abscess     Procedure performed: Incision and drainage of left breast abscess     Procedure in detail: Patient was brought to the operative theater laid in a supine position.  Intravenous general anesthesia provided.  Preoperative antibiotics administered.  The area of the left breast was then sterilely prepped and draped in normal surgical fashion using chlorhexidine.  1% lidocaine with epinephrine infiltrate the subcutaneous tissue surrounding the fluctuant region along the medial aspect of the left breast.  A 11 scalpel was then used to incise the skin overlying the fluctuant region with dissection down to the underlying abscess cavity.  Blunt dissection was then performed into a secondary cavity just deep to this region.  Blunt dissection was then performed for breaking up all the loculations within the abscess cavity.  It was fully empty.  Cultures were performed x2.  It was then thoroughly irrigated and then packed with half-inch iodoform gauze.  A sterile bandage placed upon the top.  Patient was then relieved of anesthesia stable condition and transferred back to the floor.      Complications: None   Estimated blood loss: 5 cc   Specimens:  Breast abscess fluid for culture     Disposition: Upon recovering from anesthesia patient will be transferred back to the floor for further medical management and therapeutic IV antibiotics     Recommendations:  Would recommend wound packing changes at least 1-2 times per day with a plain gauze ribbon gauze and covered with a sterile bandage.  Continue with p.o. antibiotics as an outpatient and follow up in surgery Clinic in 1 week.  Would also recommend patient  follow up with OBGYN in relation to breast-feeding.    Natalie Clemente MD

## 2024-11-05 NOTE — ED PROVIDER NOTES
Encounter Date: 2024       History     Chief Complaint   Patient presents with    Breast Pain     PT states she finished atbx Friday for left sided breast mastitis, and now pain, swelling, and warmth is back.      Patient complains of left breast pain redness, fever to 100.9 last night.  She woke with sweats this morning.  She recently completed a 14 day course of antibiotics for similar complaints.  She is currently breastfeeding.  She relates a negative past medical history.        Review of patient's allergies indicates:  No Known Allergies  Past Medical History:   Diagnosis Date    Asthma      Past Surgical History:   Procedure Laterality Date    AUGMENTATION OF BREAST Bilateral 2019     SECTION N/A 2024    Procedure:  SECTION;  Surgeon: Alberto Chu Jr., MD;  [facility];  Service: OB/GYN;  Laterality: N/A;     Family History   Problem Relation Name Age of Onset    Neuropathy Mother      Hypothyroidism Mother      No Known Problems Father       Social History     Tobacco Use    Smoking status: Never    Smokeless tobacco: Never   Substance Use Topics    Alcohol use: Not Currently    Drug use: Not Currently     Review of Systems   Constitutional:  Positive for fever.   Eyes: Negative.    Respiratory: Negative.     Cardiovascular: Negative.    Gastrointestinal: Negative.    Skin:  Positive for color change.        Left breast red hot swollen tenderness       Physical Exam     Initial Vitals [24 0929]   BP Pulse Resp Temp SpO2   (!) 141/87 82 18 98 °F (36.7 °C) 98 %      MAP       --         Physical Exam    Constitutional: She appears well-developed and well-nourished.   Cardiovascular:  Normal rate, regular rhythm and normal heart sounds.           Pulmonary/Chest: Breath sounds normal.     Skin:   Notably significant mastitis to the left breast with a 6 cm area of fluctuance on palpation.  Female RN present during exam.         ED Course   Procedures  Labs Reviewed   CBC  WITH DIFFERENTIAL - Abnormal       Result Value    WBC 7.00      RBC 4.82      Hgb 11.1 (*)     Hct 36.2      MCV 75.1 (*)     MCH 23.0 (*)     MCHC 30.7 (*)     RDW 16.5 (*)     Platelet 285      MPV 10.0      Neut % 61.9      Lymph % 25.9      Mono % 11.4      Eos % 0.6 (*)     Basophil % 0.1      Lymph # 1.81      Neut # 4.33      Mono # 0.80 (*)     Eos # 0.04      Baso # 0.01      IG# 0.01      IG% 0.1      NRBC% 0.0     LACTIC ACID, PLASMA - Normal    Lactic Acid Level 1.8     CBC W/ AUTO DIFFERENTIAL    Narrative:     The following orders were created for panel order CBC auto differential.  Procedure                               Abnormality         Status                     ---------                               -----------         ------                     CBC with Differential[[id number]]       Abnormal            Final result                 Please view results for these tests on the individual orders.   COMPREHENSIVE METABOLIC PANEL    Sodium 140      Potassium 3.8      Chloride 105      CO2 23      Glucose 97      Blood Urea Nitrogen 11      Creatinine 0.78      Calcium 9.4      Protein Total 8.0      Albumin 4.6      Globulin 3.4      Albumin/Globulin Ratio 1.4      Bilirubin Total 0.5      ALP 83      ALT 27      AST 29      eGFR >90      Anion Gap 12.0      BUN/Creatinine Ratio 14            Imaging Results              US Breast Left Limited (Preliminary result)  Result time 11/05/24 11:46:59      Wet Read by Bakari Woodson MD (11/05/24 11:46:59, [facility], Emergency Medicine)    Drainable abscess/fluid collection                                     Medications   acetaminophen tablet 1,000 mg (1,000 mg Oral Given 11/5/24 1113)     Medical Decision Making  Amount and/or Complexity of Data Reviewed  Labs: ordered.  Radiology: ordered and independent interpretation performed.  Discussion of management or test interpretation with external provider(s): Case discussed  with surgery on-call.  He will perform I and D this evening.  We will admit the patient under the care of the hospitalist.    Risk  OTC drugs.                                      Clinical Impression:  Final diagnoses:  [N61.1] Breast abscess (Primary)  [N61.0] Mastitis in female          ED Disposition Condition    Observation Stable                Bakari Woodson MD  11/05/24 7516

## 2024-11-05 NOTE — H&P
Hospital Medicine  History & Physical Examination       Patient: Kennedi Adrian  MRN: [id number]  STATUS: OP- Observation   DOS: 2024   PCP: Alvarez Galdamez PA-C      CC: Left breast pain       HISTORY OF PRESENT ILLNESS   29 y.o. female left breast pain redness, fever to 100.9 last night. She woke with sweats this morning. She recently completed a 14 day course of antibiotics for similar complaints. She is currently breastfeeding. She relates a negative past medical history.     REVIEW OF SYSTEMS     Except as documented, all other systems reviewed and negative       PAST MEDICAL HISTORY     Past Medical History:   Diagnosis Date    Asthma           PAST SURGICAL HISTORY     Past Surgical History:   Procedure Laterality Date    AUGMENTATION OF BREAST Bilateral 2019     SECTION N/A 2024    Procedure:  SECTION;  Surgeon: Alberto Chu Jr., MD;  Location: [facility];  Service: OB/GYN;  Laterality: N/A;          FAMILY HISTORY     Reviewed, negative in relation to patient's current condition.      SOCIAL HISTORY     Denies alcohol, tobacco or drug abuse    Social History     Tobacco Use    Smoking status: Never    Smokeless tobacco: Never   Substance Use Topics    Alcohol use: Not Currently          ALLERGIES     Patient has no known allergies.      HOME MEDICATIONS     Current Outpatient Medications   Medication Instructions    oxyCODONE-acetaminophen (PERCOCET)  mg per tablet 1 tablet, Oral, Every 6 hours PRN          PHYSICAL EXAM   VITALS: T 97.7 °F (36.5 °C)   /89   P 68   RR 18   O2 100 %    GENERAL: Awake and in NAD  HEENT: NC/AT, EOMI, PERRL.  NECK: Supple,  No JVD  LUNGS: CTA B/L  CVS: RRR, S1S2 positive  GI/: Soft, NT/ND, bowel sounds positive.  EXTREMITIES: Peripheral pulses 2+, no peripheral edema  DERM: L breast abscess  NEURO: AAOx3, no focal neurologic deficit  PSYCH: Cooperative, appropriate mood and affect       DIAGNOSTICS     Recent Labs     24  0949  "  WBC 7.00   RBC 4.82   HGB 11.1*   HCT 36.2   MCV 75.1*   MCH 23.0*   MCHC 30.7*   RDW 16.5*        No results for input(s): "LACTIC" in the last 72 hours.  No results for input(s): "INR", "APTT", "D-DIMER" in the last 72 hours.  No results for input(s): "HGBA1C", "CHOL", "TRIG", "LDL", "VLDL", "HDL" in the last 72 hours.   Recent Labs     11/05/24  0949      K 3.8   CO2 23   BUN 11   CREATININE 0.78   GLUCOSE 97   CALCIUM 9.4   ALBUMIN 4.6   GLOBULIN 3.4   ALKPHOS 83   ALT 27   AST 29   BILITOT 0.5     No results for input(s): "BNP", "CPK", "TROPONINI" in the last 72 hours.       US OB 14+ Weeks TransAbd, w/Biophysical Profile, w/o NST, Single Gestation (xpd)  Narrative: EXAMINATION:  STUDY: US OB 14+ WEEKS, TRANSABDOM W/ BIOPHYSICAL PROFILE, W/O NST, SINGLE GESTATION (XPD)    CLINICAL HISTORY AND TECHNIQUE:  Suspected premature rupture of membranes    COMPARISON:  None    FINDINGS:  Limited transabdominal imaging was performed and demonstrates a single intrauterine pregnancy currently in a cephalic presentation.  The fetus demonstrates normal spontaneous motility in unremarkable cardiac activity with a fetal heart rate of 138 beats per minute.  A grade 0 placenta is located posteriorly.  Limited imaging demonstrates no definite fetal or maternal abnormalities.  The biophysical profile is 8/8 (NST to be performed in Ob, FM 2/2, FBM 2/2, FT 2/2, AFV 2/2).  Amniotic fluid index is 15.1cm with 3 vertical pockets measuring greater than 2 cm.  Impression: 1. A single intrauterine pregnancy is present and currently in a cephalic presentation with a BPP score of 8/8(NST to be performed in Ob).  The KELLE is 15.1 cm with no findings to suggest oligo hydramnios or premature rupture membranes.    Electronically signed by: Mine Kent  Date:    07/03/2024  Time:    11:33        ASSESSMENT   L Breast Abscess w/ cellulitis     PLAN   Admit to observation   Pain control  Iv abx  Surgery consulted     Prophylaxis: " lovenox  Code Status: full code       Patient Screened for food insecurity, housing instability, transportation needs, utility difficulties, and interpersonal safety.  No needs identified      Gautam Roman MD  [facility]

## 2024-11-05 NOTE — ANESTHESIA PREPROCEDURE EVALUATION
11/05/2024  Kennedi Adiran is a 29 y.o., female.      Pre-op Assessment    I have reviewed the Patient Summary Reports.     I have reviewed the Nursing Notes. I have reviewed the NPO Status.   I have reviewed the Medications.     Review of Systems  Anesthesia Hx:  No problems with previous Anesthesia             Denies Family Hx of Anesthesia complications.    Denies Personal Hx of Anesthesia complications.                    Social:  Vaping, Alcohol Use Social wine use      Hematology/Oncology:  Hematology Normal   Oncology Normal                                   EENT/Dental:  EENT/Dental Normal           Cardiovascular:  Cardiovascular Normal                                              Pulmonary:    Asthma                    Renal/:  Renal/ Normal                 Hepatic/GI:  Hepatic/GI Normal                    Musculoskeletal:  Musculoskeletal Normal                Neurological:  Neurology Normal                                      Endocrine:  Endocrine Normal            Dermatological:  Skin Normal    Psych:  Psychiatric Normal                    Physical Exam  General: Well nourished, Cooperative, Alert and Oriented    Airway:  Mallampati: II / II  Mouth Opening: Normal  TM Distance: Normal  Tongue: Normal  Neck ROM: Normal ROM    Dental:  Intact    Chest/Lungs:  Clear to auscultation, Normal Respiratory Rate    Heart:  Sounds: Normal        Anesthesia Plan  Type of Anesthesia, risks & benefits discussed:    Anesthesia Type: Gen Supraglottic Airway  Intra-op Monitoring Plan: Standard ASA Monitors  Post Op Pain Control Plan: multimodal analgesia  Induction:  IV  Airway Plan: Direct  Informed Consent: Informed consent signed with the Patient and all parties understand the risks and agree with anesthesia plan.  All questions answered. Patient consented to blood products? Yes  ASA Score: 2  Emergent  Day of Surgery Review of History & Physical: H&P Update referred to the surgeon/provider.I have interviewed and examined the patient. I have reviewed the patient's H&P dated: There are no significant changes.     Ready For Surgery From Anesthesia Perspective.     .

## 2024-11-05 NOTE — TRANSFER OF CARE
"Anesthesia Transfer of Care Note    Patient: Kennedi Adrian    Procedure(s) Performed: Procedure(s) (LRB):  INCISION AND DRAINAGE, ABSCESS (Left)    Patient location: PACU    Anesthesia Type: general    Transport from OR: Transported from OR on room air with adequate spontaneous ventilation    Post pain: adequate analgesia    Post assessment: no apparent anesthetic complications    Post vital signs: stable    Level of consciousness: responds to stimulation    Nausea/Vomiting: no nausea/vomiting    Complications: none    Transfer of care protocol was followed      Last vitals: Visit Vitals  BP (P) 129/87 (BP Location: Right arm, Patient Position: Lying)   Pulse 72   Temp 36.6 °C (97.9 °F) (Skin)   Resp 16   Ht 5' 7" (1.702 m)   Wt 96.8 kg (213 lb 8 oz)   SpO2 97%   Breastfeeding Yes   BMI 33.44 kg/m²     "

## 2024-11-05 NOTE — ANESTHESIA PROCEDURE NOTES
Intubation    Date/Time: 11/5/2024 5:11 PM    Performed by: Mart Maldonado CRNA  Authorized by: Mart Maldonado CRNA    Intubation:     Induction:  Intravenous    Intubated:  Postinduction    Mask Ventilation:  Not attempted    Attempts:  1    Attempted By:  CRNA    Difficult Airway Encountered?: No      Complications:  None    Airway Device:  Supraglottic airway/LMA    Airway Device Size:  5.0    Style/Cuff Inflation:  Cuffed    Complicating Factors:  None    Findings Post-Intubation:  BS equal bilateral and atraumatic/condition of teeth unchanged

## 2024-11-06 VITALS
HEIGHT: 67 IN | HEART RATE: 63 BPM | OXYGEN SATURATION: 98 % | BODY MASS INDEX: 33.51 KG/M2 | RESPIRATION RATE: 19 BRPM | DIASTOLIC BLOOD PRESSURE: 72 MMHG | SYSTOLIC BLOOD PRESSURE: 115 MMHG | TEMPERATURE: 98 F | WEIGHT: 213.5 LBS

## 2024-11-06 LAB
ALBUMIN SERPL-MCNC: 4.1 G/DL (ref 3.4–5)
ALBUMIN/GLOB SERPL: 1.4 RATIO
ALP SERPL-CCNC: 93 UNIT/L (ref 50–144)
ALT SERPL-CCNC: 21 UNIT/L (ref 1–45)
ANION GAP SERPL CALC-SCNC: 8 MEQ/L (ref 2–13)
AST SERPL-CCNC: 23 UNIT/L (ref 14–36)
BASOPHILS # BLD AUTO: 0.01 X10(3)/MCL (ref 0.01–0.08)
BASOPHILS NFR BLD AUTO: 0.1 % (ref 0.1–1.2)
BILIRUB SERPL-MCNC: 0.5 MG/DL (ref 0–1)
BUN SERPL-MCNC: 11 MG/DL (ref 7–20)
CALCIUM SERPL-MCNC: 9.3 MG/DL (ref 8.4–10.2)
CHLORIDE SERPL-SCNC: 104 MMOL/L (ref 98–110)
CO2 SERPL-SCNC: 25 MMOL/L (ref 21–32)
CREAT SERPL-MCNC: 0.67 MG/DL (ref 0.66–1.25)
CREAT/UREA NIT SERPL: 16 (ref 12–20)
EOSINOPHIL # BLD AUTO: 0 X10(3)/MCL (ref 0.04–0.36)
EOSINOPHIL NFR BLD AUTO: 0 % (ref 0.7–7)
ERYTHROCYTE [DISTWIDTH] IN BLOOD BY AUTOMATED COUNT: 16.3 % (ref 11–14.5)
GFR SERPLBLD CREATININE-BSD FMLA CKD-EPI: >90 ML/MIN/1.73/M2
GLOBULIN SER-MCNC: 2.9 GM/DL (ref 2–3.9)
GLUCOSE SERPL-MCNC: 133 MG/DL (ref 70–115)
GRAM STN SPEC: NORMAL
GRAM STN SPEC: NORMAL
HCT VFR BLD AUTO: 32.6 % (ref 36–48)
HGB BLD-MCNC: 10.2 G/DL (ref 11.8–16)
IMM GRANULOCYTES # BLD AUTO: 0.05 X10(3)/MCL (ref 0–0.03)
IMM GRANULOCYTES NFR BLD AUTO: 0.4 % (ref 0–0.5)
LYMPHOCYTES # BLD AUTO: 1.13 X10(3)/MCL (ref 1.16–3.74)
LYMPHOCYTES NFR BLD AUTO: 9.7 % (ref 20–55)
MCH RBC QN AUTO: 23.2 PG (ref 27–34)
MCHC RBC AUTO-ENTMCNC: 31.3 G/DL (ref 31–37)
MCV RBC AUTO: 74.3 FL (ref 79–99)
MONOCYTES # BLD AUTO: 0.9 X10(3)/MCL (ref 0.24–0.36)
MONOCYTES NFR BLD AUTO: 7.7 % (ref 4.7–12.5)
NEUTROPHILS # BLD AUTO: 9.6 X10(3)/MCL (ref 1.56–6.13)
NEUTROPHILS NFR BLD AUTO: 82.1 % (ref 37–73)
NRBC BLD AUTO-RTO: 0 %
PLATELET # BLD AUTO: 330 X10(3)/MCL (ref 140–371)
PMV BLD AUTO: 9.8 FL (ref 9.4–12.4)
POTASSIUM SERPL-SCNC: 4.6 MMOL/L (ref 3.5–5.1)
PROT SERPL-MCNC: 7 GM/DL (ref 6.3–8.2)
RBC # BLD AUTO: 4.39 X10(6)/MCL (ref 4–5.1)
SODIUM SERPL-SCNC: 137 MMOL/L (ref 136–145)
WBC # BLD AUTO: 11.69 X10(3)/MCL (ref 4–11.5)

## 2024-11-06 PROCEDURE — 85025 COMPLETE CBC W/AUTO DIFF WBC: CPT | Performed by: FAMILY MEDICINE

## 2024-11-06 PROCEDURE — 25000003 PHARM REV CODE 250: Performed by: SURGERY

## 2024-11-06 PROCEDURE — 25000003 PHARM REV CODE 250: Performed by: FAMILY MEDICINE

## 2024-11-06 PROCEDURE — 36415 COLL VENOUS BLD VENIPUNCTURE: CPT | Performed by: FAMILY MEDICINE

## 2024-11-06 PROCEDURE — G0378 HOSPITAL OBSERVATION PER HR: HCPCS

## 2024-11-06 PROCEDURE — 63600175 PHARM REV CODE 636 W HCPCS: Performed by: SURGERY

## 2024-11-06 PROCEDURE — 94761 N-INVAS EAR/PLS OXIMETRY MLT: CPT

## 2024-11-06 PROCEDURE — 80053 COMPREHEN METABOLIC PANEL: CPT | Performed by: FAMILY MEDICINE

## 2024-11-06 RX ORDER — HYDROCODONE BITARTRATE AND ACETAMINOPHEN 5; 325 MG/1; MG/1
1 TABLET ORAL EVERY 6 HOURS PRN
Status: DISCONTINUED | OUTPATIENT
Start: 2024-11-06 | End: 2024-11-06 | Stop reason: HOSPADM

## 2024-11-06 RX ORDER — AMOXICILLIN AND CLAVULANATE POTASSIUM 875; 125 MG/1; MG/1
1 TABLET, FILM COATED ORAL EVERY 12 HOURS
Qty: 20 TABLET | Refills: 0 | Status: SHIPPED | OUTPATIENT
Start: 2024-11-06 | End: 2024-11-16

## 2024-11-06 RX ORDER — HYDROCODONE BITARTRATE AND ACETAMINOPHEN 5; 325 MG/1; MG/1
1 TABLET ORAL EVERY 6 HOURS PRN
Qty: 20 TABLET | Refills: 0 | Status: SHIPPED | OUTPATIENT
Start: 2024-11-06

## 2024-11-06 RX ORDER — HYDROCODONE BITARTRATE AND ACETAMINOPHEN 5; 325 MG/1; MG/1
1 TABLET ORAL EVERY 6 HOURS PRN
Qty: 10 TABLET | Refills: 0 | Status: SHIPPED | OUTPATIENT
Start: 2024-11-06 | End: 2024-11-06 | Stop reason: HOSPADM

## 2024-11-06 RX ADMIN — HYDROMORPHONE HYDROCHLORIDE 0.5 MG: 1 INJECTION, SOLUTION INTRAMUSCULAR; INTRAVENOUS; SUBCUTANEOUS at 04:11

## 2024-11-06 RX ADMIN — AMPICILLIN AND SULBACTAM 3 G: 2; 1 INJECTION, POWDER, FOR SOLUTION INTRAVENOUS at 06:11

## 2024-11-06 RX ADMIN — HYDROCODONE BITARTRATE AND ACETAMINOPHEN 1 TABLET: 5; 325 TABLET ORAL at 10:11

## 2024-11-06 RX ADMIN — HYDROMORPHONE HYDROCHLORIDE 0.5 MG: 1 INJECTION, SOLUTION INTRAMUSCULAR; INTRAVENOUS; SUBCUTANEOUS at 09:11

## 2024-11-06 NOTE — PLAN OF CARE
11/06/24 0935   Final Note   Assessment Type Final Discharge Note   Anticipated Discharge Disposition Home-Health  ([facility])   What phone number can be called within the next 1-3 days to see how you are doing after discharge? [phone]   Hospital Resources/Appts/Education Provided Post-Acute resouces added to AVS   Post-Acute Status   Post-Acute Authorization Home Health   Home Health Status Set-up Complete/Auth obtained   Coverage [facility] Community Plan   Patient choice form signed by patient/caregiver List with quality metrics by geographic area provided;List from CMS Compare;List from System Post-Acute Care   Discharge Delays None known at this time

## 2024-11-06 NOTE — PLAN OF CARE
Patient transferred to inpatient room in stable condition. Dynamap monitors applied, SCD machine applied, call bell in reach, RN and family at bedside, RN verbalized understanding of report.

## 2024-11-06 NOTE — PROGRESS NOTES
[facility]-Med/Surg  Wound Care    Patient Name:  Kennedi Adrian   MRN:  [id number]  Date: 11/6/2024  Diagnosis: Breast abscess    History:     Past Medical History:   Diagnosis Date    Asthma        Social History     Socioeconomic History    Marital status: Single   Tobacco Use    Smoking status: Never    Smokeless tobacco: Never   Substance and Sexual Activity    Alcohol use: Not Currently    Drug use: Not Currently    Sexual activity: Yes     Partners: Male     Social Drivers of Health     Financial Resource Strain: Low Risk  (11/5/2024)    Overall Financial Resource Strain (CARDIA)     Difficulty of Paying Living Expenses: Not hard at all   Food Insecurity: No Food Insecurity (11/5/2024)    Hunger Vital Sign     Worried About Running Out of Food in the Last Year: Never true     Ran Out of Food in the Last Year: Never true   Transportation Needs: No Transportation Needs (11/5/2024)    TRANSPORTATION NEEDS     Transportation : No   Physical Activity: Inactive (11/5/2024)    Exercise Vital Sign     Days of Exercise per Week: 0 days     Minutes of Exercise per Session: 0 min   Stress: No Stress Concern Present (11/5/2024)    [employer] [place] [employer] - Occupational Stress Questionnaire     Feeling of Stress : Not at all   Housing Stability: Low Risk  (11/5/2024)    Housing Stability Vital Sign     Unable to Pay for Housing in the Last Year: No     Homeless in the Last Year: No       Precautions:     Allergies as of 11/05/2024    (No Known Allergies)       WOC Assessment Details/Treatment        11/06/24 1115   WOCN Assessment   WOCN Total Time (mins) 30   Visit Date 11/06/24   Visit Time 1115   Consult Type New   WOCN Speciality Wound   Wound surgical   Intervention changed        Wound 11/05/24 1732 Incision Left Breast   Date First Assessed/Time First Assessed: 11/05/24 1732   Primary Wound Type: Incision  Side: Left  Location: Breast  Closure Method: No Closure (see comments)   "Additional Comments: packed with 1/2" iodoform, dressed with 4x4, medipore tape   Wound Image    Dressing Appearance Open to air   Drainage Amount Moderate   Drainage Characteristics/Odor Serosanguineous   Appearance Red;Moist   Periwound Area Redness;Indurated   Wound Edges Defined   Care Cleansed with:;Sterile normal saline   Dressing Applied  (packed with saline moist 2x2 and covered with gauze and medipore.)   Dressing Change Due 11/07/24      Education for wound care provided to patient's mother.  Verbalized understanding.  Supplies given for home use.  Written instructions provided by staff nurse, BRITTNEY Boyd upon D/C.    Patient tolerated dressing change well.  There was an area of tunneling noted around 4-5 o'clock measuring 3.1 cm.  Home health consulted.      11/06/2024    "

## 2024-11-06 NOTE — DISCHARGE SUMMARY
Hospital Medicine  Discharge Summary    Patient Name: Kennedi Adrian  MRN: [id number]  Admit Date: 11/5/2024  Discharge Date:  11/6/2024  Status: OP- Observation   Length of Stay: 0      PHYSICIANS   Admitting Physician: Gautam Roman MD  Discharging Physician: Gautam Roman MD.  Primary Care Physician: Alvarez Galdamez PA-C        DISCHARGE DIAGNOSES   L Breast Abscess w/ cellulitis- S/p I&D      PROCEDURES   INCISION AND DRAINAGE, ABSCESS (Left)         HOSPITAL COURSE      S/p I&D Left breast abscess  Stable for d/c home today  Cont wound, home health, pain control, po abx course  Follow up surgery clinic 1 week dr clemente     STATUS  Improved, Stable    DISPOSITION  Discharge to     DIET      ACTIVITY  As tolerated      FOLLOW-UP      Follow-up Information       Natalie Clemente MD Follow up in 1 week(s).    Specialty: General Surgery  Contact information:  [address] [place] [address]  [place] [id number] [phone]                               DISCHARGE MEDICATION RECONCILIATION        Medication List        START taking these medications      amoxicillin-clavulanate 875-125mg 875-125 mg per tablet  Commonly known as: AUGMENTIN  Take 1 tablet by mouth every 12 (twelve) hours. for 10 days     HYDROcodone-acetaminophen 5-325 mg per tablet  Commonly known as: NORCO  Take 1 tablet by mouth every 6 (six) hours as needed for Pain.               Where to Get Your Medications        These medications were sent to [facility] [address] [address]  [address] [place] [address] [place] [address]      Phone: [phone]   amoxicillin-clavulanate 875-125mg 875-125 mg per tablet  HYDROcodone-acetaminophen 5-325 mg per tablet           PHYSICAL EXAM   VITALS: T 97.5 °F (36.4 °C)   /72   P 60   RR 18   O2 97 %        GENERAL: Awake and in NAD  HEENT: NC/AT, EOMI, PERRL.  NECK: Supple,  No JVD  LUNGS: CTA B/L  CVS: RRR, S1S2 positive  GI/: Soft, NT/ND, bowel sounds positive.  EXTREMITIES: Peripheral  "pulses 2+, no peripheral edema  DERM: L breast abscess  NEURO: AAOx3, no focal neurologic deficit  PSYCH: Cooperative, appropriate mood and affect     DIAGNOSITCS   CBC:   Recent Labs   Lab 11/05/24  0949 11/06/24 0714   WBC 7.00 11.69*   HGB 11.1* 10.2*   HCT 36.2 32.6*    330       CMP:   Recent Labs   Lab 11/05/24 0949 11/06/24  0714   CALCIUM 9.4 9.3   ALBUMIN 4.6 4.1    137   K 3.8 4.6   CO2 23 25    104   BUN 11 11   CREATININE 0.78 0.67   ALKPHOS 83 93   ALT 27 21   AST 29 23   BILITOT 0.5 0.5     Estimated Creatinine Clearance: 146.7 mL/min (based on SCr of 0.67 mg/dL).    Labs within the past 24 hours have been reviewed.     COAG:  No results for input(s): "APTT", "INR", "PTT" in the last 168 hours.    CARDIAC ENZYMES: No results for input(s): "TROPONINI", "CPK", "CKMB" in the last 72 hours.     No results for input(s): "AMYLASE", "LIPASE" in the last 168 hours.    No results for input(s): "POCTGLUCOSE" in the last 72 hours.     Microbiology Results (last 7 days)       Procedure Component Value Units Date/Time    Gram Stain [[id number]] Collected: 11/05/24 1752    Order Status: Sent Specimen: Fluids from Breast, Left Updated: 11/05/24 1805    Body Fluid Culture [[id number]] Collected: 11/05/24 1752    Order Status: Sent Specimen: Fluids from Breast, Left Updated: 11/05/24 1805    Anaerobic Culture [[id number]] Collected: 11/05/24 1752    Order Status: Sent Specimen: Fluids from Breast, Left              No results for input(s): "CHOL", "TRIG", "HDL", "LDL" in the last 72 hours. No results found for: "LABA1C", "HGBA1C"     US Soft Tissue Axilla, Left    Result Date: 11/5/2024  EXAMINATION: US SOFT TISSUE AXILLA, LEFT CLINICAL HISTORY: abcess; TECHNIQUE: Grayscale and color flow mapping COMPARISON: None FINDINGS: 2D imaging of the left breast was performed.  The images show the presence of and irregular heterogeneous collection which demonstrates increased perfusion on color flow imaging " along its periphery.  The overall measurement is approximately 3.6 x 3.2 x 4 cm and is suspicious for an abscess.     The findings are suspicious for an abscess involving the left breast at approximately the 10 o'clock position as described above. Electronically signed by: Silverio Reyes Date:    11/05/2024 Time:    16:48      N/A     Patient Screened for food insecurity, housing instability, transportation needs, utility difficulties, and interpersonal safety.  No needs identified    Discharge time: 33 minutes         Gautam Roman MD  [facility]

## 2024-11-06 NOTE — PLAN OF CARE
11/05/24 1427   Discharge Assessment   Assessment Type Discharge Planning Assessment   Confirmed/corrected address, phone number and insurance Yes   Confirmed Demographics Correct on Facesheet   Source of Information patient   When was your last doctors appointment?   (2.5 weeks ago)   Communicated JUSTIN with patient/caregiver Yes   Reason For Admission Mastitis, Breast Abscess   People in Home child(ford), dependent   Facility Arrived From: Home   Do you expect to return to your current living situation? Yes   Do you have help at home or someone to help you manage your care at home? Yes   Who are your caregiver(s) and their phone number(s)? Melodie Goldstein-Mother [phone]   Prior to hospitilization cognitive status: Alert/Oriented   Current cognitive status: Alert/Oriented   Walking or Climbing Stairs Difficulty no   Dressing/Bathing Difficulty no   Equipment Currently Used at Home none   Readmission within 30 days? No   Patient currently being followed by outpatient case management? No   Do you currently have service(s) that help you manage your care at home? No   Do you take prescription medications? No   Do you have prescription coverage? Yes   Coverage [facility] Community Plan   Who is going to help you get home at discharge? Mother   How do you get to doctors appointments? car, drives self   Are you on dialysis? No   Do you take coumadin? No   Discharge Plan A Home Health   DME Needed Upon Discharge  wound care supplies   Discharge Plan discussed with: Patient;Parent(s)   Name(s) and Number(s) Melodie Martinez  [phone]   Transition of Care Barriers None   Physical Activity   On average, how many days per week do you engage in moderate to strenuous exercise (like a brisk walk)? 0 days   On average, how many minutes do you engage in exercise at this level? 0 min   Financial Resource Strain   How hard is it for you to pay for the very basics like food, housing, medical care, and heating? Not hard   Housing  Stability   In the last 12 months, was there a time when you were not able to pay the mortgage or rent on time? N   At any time in the past 12 months, were you homeless or living in a shelter (including now)? N   Transportation Needs   Has the lack of transportation kept you from medical appointments, meetings, work or from getting things needed for daily living? No   Food Insecurity   Within the past 12 months, you worried that your food would run out before you got the money to buy more. Never true   Within the past 12 months, the food you bought just didn't last and you didn't have money to get more. Never true   Stress   Do you feel stress - tense, restless, nervous, or anxious, or unable to sleep at night because your mind is troubled all the time - these days? Not at all   Social Isolation   How often do you feel lonely or isolated from those around you?  Never   Alcohol Use   Q1: How often do you have a drink containing alcohol? 2-4 pr month   Q2: How many drinks containing alcohol do you have on a typical day when you are drinking? 1 or 2   Q3: How often do you have six or more drinks on one occasion? Never   Utilities   In the past 12 months has the electric, gas, oil, or water company threatened to shut off services in your home? No   Health Literacy   How often do you need to have someone help you when you read instructions, pamphlets, or other written material from your doctor or pharmacy? Never   OTHER   Name(s) of People in Home  child     Discussed home health with patient and mother.  Patient given CCP list of home health companies.  Patient has no preference and signed CCP accordingly.

## 2024-11-06 NOTE — PLAN OF CARE
Physician ordered to set-up home health care services for patient at discharge, Referral made to [facility] per phone/fa, spoke with Obdulia. [facility] nurse to admit patient tomorrow.

## 2024-11-06 NOTE — ADDENDUM NOTE
Addendum  created 11/05/24 1859 by Mart Maldonado CRNA    Flowsheet accepted, Intraprocedure Flowsheets edited

## 2024-11-08 LAB — BACTERIA FLD CULT: ABNORMAL

## 2024-11-09 LAB — BACTERIA SPEC ANAEROBE CULT: NORMAL

## 2024-12-23 ENCOUNTER — OFFICE VISIT (OUTPATIENT)
Dept: FAMILY MEDICINE | Facility: CLINIC | Age: 29
End: 2024-12-23
Payer: MEDICAID

## 2024-12-23 VITALS
HEART RATE: 72 BPM | DIASTOLIC BLOOD PRESSURE: 70 MMHG | HEIGHT: 67 IN | SYSTOLIC BLOOD PRESSURE: 120 MMHG | WEIGHT: 215.38 LBS | TEMPERATURE: 97 F | OXYGEN SATURATION: 99 % | BODY MASS INDEX: 33.8 KG/M2

## 2024-12-23 DIAGNOSIS — Z76.89 ENCOUNTER TO ESTABLISH CARE: Primary | ICD-10-CM

## 2024-12-23 DIAGNOSIS — Z13.31 POSITIVE DEPRESSION SCREENING: ICD-10-CM

## 2024-12-23 LAB
25(OH)D3+25(OH)D2 SERPL-MCNC: 27 NG/ML (ref 30–80)
ALBUMIN SERPL-MCNC: 4.4 G/DL (ref 3.4–5)
ALBUMIN/GLOB SERPL: 1.5 RATIO
ALP SERPL-CCNC: 75 UNIT/L (ref 50–144)
ALT SERPL-CCNC: 24 UNIT/L (ref 1–45)
ANION GAP SERPL CALC-SCNC: 7 MEQ/L (ref 2–13)
AST SERPL-CCNC: 31 UNIT/L (ref 14–36)
BASOPHILS # BLD AUTO: 0.01 X10(3)/MCL (ref 0.01–0.08)
BASOPHILS NFR BLD AUTO: 0.2 % (ref 0.1–1.2)
BILIRUB SERPL-MCNC: 0.6 MG/DL (ref 0–1)
BUN SERPL-MCNC: 11 MG/DL (ref 7–20)
CALCIUM SERPL-MCNC: 9 MG/DL (ref 8.4–10.2)
CHLORIDE SERPL-SCNC: 107 MMOL/L (ref 98–110)
CO2 SERPL-SCNC: 25 MMOL/L (ref 21–32)
CREAT SERPL-MCNC: 0.75 MG/DL (ref 0.66–1.25)
CREAT/UREA NIT SERPL: 15 (ref 12–20)
EOSINOPHIL # BLD AUTO: 0.04 X10(3)/MCL (ref 0.04–0.36)
EOSINOPHIL NFR BLD AUTO: 0.8 % (ref 0.7–7)
ERYTHROCYTE [DISTWIDTH] IN BLOOD BY AUTOMATED COUNT: 16.5 % (ref 11–14.5)
EST. AVERAGE GLUCOSE BLD GHB EST-MCNC: 91.1 MG/DL (ref 70–115)
GFR SERPLBLD CREATININE-BSD FMLA CKD-EPI: >90 ML/MIN/1.73/M2
GLOBULIN SER-MCNC: 3 GM/DL (ref 2–3.9)
GLUCOSE SERPL-MCNC: 74 MG/DL (ref 70–115)
HBA1C MFR BLD: 4.8 % (ref 4–6)
HCT VFR BLD AUTO: 37.6 % (ref 36–48)
HGB BLD-MCNC: 11.7 G/DL (ref 11.8–16)
IMM GRANULOCYTES # BLD AUTO: 0.01 X10(3)/MCL (ref 0–0.03)
IMM GRANULOCYTES NFR BLD AUTO: 0.2 % (ref 0–0.5)
LYMPHOCYTES # BLD AUTO: 2.08 X10(3)/MCL (ref 1.16–3.74)
LYMPHOCYTES NFR BLD AUTO: 39.5 % (ref 20–55)
MCH RBC QN AUTO: 23.9 PG (ref 27–34)
MCHC RBC AUTO-ENTMCNC: 31.1 G/DL (ref 31–37)
MCV RBC AUTO: 76.9 FL (ref 79–99)
MONOCYTES # BLD AUTO: 0.63 X10(3)/MCL (ref 0.24–0.36)
MONOCYTES NFR BLD AUTO: 12 % (ref 4.7–12.5)
NEUTROPHILS # BLD AUTO: 2.49 X10(3)/MCL (ref 1.56–6.13)
NEUTROPHILS NFR BLD AUTO: 47.3 % (ref 37–73)
NRBC BLD AUTO-RTO: 0 %
PLATELET # BLD AUTO: 351 X10(3)/MCL (ref 140–371)
PMV BLD AUTO: 11.4 FL (ref 9.4–12.4)
POTASSIUM SERPL-SCNC: 4.6 MMOL/L (ref 3.5–5.1)
PROT SERPL-MCNC: 7.4 GM/DL (ref 6.3–8.2)
RBC # BLD AUTO: 4.89 X10(6)/MCL (ref 4–5.1)
SODIUM SERPL-SCNC: 139 MMOL/L (ref 136–145)
T4 FREE SERPL-MCNC: 0.81 NG/DL (ref 0.78–2.19)
TSH SERPL-ACNC: 6.3 UIU/ML (ref 0.36–3.74)
WBC # BLD AUTO: 5.26 X10(3)/MCL (ref 4–11.5)

## 2024-12-23 PROCEDURE — 82306 VITAMIN D 25 HYDROXY: CPT | Performed by: NURSE PRACTITIONER

## 2024-12-23 PROCEDURE — 1159F MED LIST DOCD IN RCRD: CPT | Mod: CPTII,,, | Performed by: NURSE PRACTITIONER

## 2024-12-23 PROCEDURE — 84443 ASSAY THYROID STIM HORMONE: CPT | Performed by: NURSE PRACTITIONER

## 2024-12-23 PROCEDURE — 3044F HG A1C LEVEL LT 7.0%: CPT | Mod: CPTII,,, | Performed by: NURSE PRACTITIONER

## 2024-12-23 PROCEDURE — 83036 HEMOGLOBIN GLYCOSYLATED A1C: CPT | Performed by: NURSE PRACTITIONER

## 2024-12-23 PROCEDURE — 99214 OFFICE O/P EST MOD 30 MIN: CPT | Mod: ,,, | Performed by: NURSE PRACTITIONER

## 2024-12-23 PROCEDURE — 84439 ASSAY OF FREE THYROXINE: CPT | Performed by: NURSE PRACTITIONER

## 2024-12-23 PROCEDURE — 80053 COMPREHEN METABOLIC PANEL: CPT | Performed by: NURSE PRACTITIONER

## 2024-12-23 PROCEDURE — 3078F DIAST BP <80 MM HG: CPT | Mod: CPTII,,, | Performed by: NURSE PRACTITIONER

## 2024-12-23 PROCEDURE — 3074F SYST BP LT 130 MM HG: CPT | Mod: CPTII,,, | Performed by: NURSE PRACTITIONER

## 2024-12-23 PROCEDURE — 3008F BODY MASS INDEX DOCD: CPT | Mod: CPTII,,, | Performed by: NURSE PRACTITIONER

## 2024-12-23 PROCEDURE — 85025 COMPLETE CBC W/AUTO DIFF WBC: CPT | Performed by: NURSE PRACTITIONER

## 2024-12-23 PROCEDURE — 1160F RVW MEDS BY RX/DR IN RCRD: CPT | Mod: CPTII,,, | Performed by: NURSE PRACTITIONER

## 2024-12-23 PROCEDURE — 36415 COLL VENOUS BLD VENIPUNCTURE: CPT | Performed by: NURSE PRACTITIONER

## 2024-12-23 RX ORDER — PAROXETINE 10 MG/1
10 TABLET, FILM COATED ORAL EVERY MORNING
Qty: 30 TABLET | Refills: 0 | Status: SHIPPED | OUTPATIENT
Start: 2024-12-23

## 2024-12-23 NOTE — PROGRESS NOTES
Patient ID: Kennedi Adrian  : 1995    Chief Complaint: Establish Care (Anxiety and depression /)    Allergies: Patient has No Known Allergies.     History of Present Illness:  The patient presents to clinic for Establish Care (Anxiety and depression /)     Social History:  reports that she has never smoked. She has never used smokeless tobacco. She reports current alcohol use. She reports that she does not use drugs.    Past Medical History:  has a past medical history of Acute mastitis of left breast, ADHD (attention deficit hyperactivity disorder), Asthma, Breast pain, left, Cellulitis of left breast, Hypothyroidism, unspecified, Left breast abscess, MRSA (methicillin resistant staph aureus) culture positive, and Thyroid disease.    Surgical History:   Past Surgical History:   Procedure Laterality Date    AUGMENTATION OF BREAST Bilateral 2019     SECTION N/A 2024    Procedure:  SECTION;  Surgeon: Alberto Chu Jr., MD;  Location: [facility] L&D;  Service: OB/GYN;  Laterality: N/A;    INCISION AND DRAINAGE OF ABSCESS Left 2024    Procedure: INCISION AND DRAINAGE, ABSCESS;  Surgeon: Natalie Clemente MD;  Location: [facility];  Service: General;  Laterality: Left;       Current Medications:  Current Outpatient Medications   Medication Instructions    CABRERA 0.35 mg tablet 1 tablet    HYDROcodone-acetaminophen (NORCO) 5-325 mg per tablet 1 tablet, Oral, Every 6 hours PRN    paroxetine (PAXIL) 10 mg, Oral, Every morning       Review of Systems   Constitutional:  Positive for activity change. Negative for unexpected weight change.   HENT:  Negative for hearing loss, rhinorrhea and trouble swallowing.    Eyes:  Negative for discharge and visual disturbance.   Respiratory:  Negative for chest tightness and wheezing.    Cardiovascular:  Negative for chest pain and palpitations.   Gastrointestinal:  Negative for blood in stool, constipation, diarrhea and vomiting.   Endocrine: Negative for  "polydipsia and polyuria.   Genitourinary:  Negative for difficulty urinating, dysuria, hematuria and menstrual problem.   Musculoskeletal:  Positive for neck pain. Negative for arthralgias and joint swelling.   Neurological:  Positive for headaches. Negative for weakness.   Psychiatric/Behavioral:  Positive for dysphoric mood. Negative for confusion and suicidal ideas.    All other systems reviewed and are negative.     A comprehensive review of symptoms was completed and negative except as noted above.    Visit Vitals  /70 (BP Location: Left arm, Patient Position: Sitting)   Pulse 72   Temp 97.4 °F (36.3 °C) (Temporal)   Ht 5' 7.01" (1.702 m)   Wt 97.7 kg (215 lb 6.4 oz)   LMP 12/07/2024 (Exact Date)   SpO2 99%   Breastfeeding Yes   BMI 33.73 kg/m²       Physical Exam  Vitals and nursing note reviewed.   Constitutional:       Appearance: Normal appearance. She is normal weight.   HENT:      Head: Normocephalic and atraumatic.      Right Ear: Tympanic membrane, ear canal and external ear normal.      Left Ear: Tympanic membrane, ear canal and external ear normal.      Nose: Nose normal.      Mouth/Throat:      Mouth: Mucous membranes are moist.      Pharynx: Oropharynx is clear.   Eyes:      Extraocular Movements: Extraocular movements intact.      Conjunctiva/sclera: Conjunctivae normal.      Pupils: Pupils are equal, round, and reactive to light.   Cardiovascular:      Rate and Rhythm: Normal rate and regular rhythm.      Heart sounds: Normal heart sounds.   Pulmonary:      Effort: Pulmonary effort is normal.      Breath sounds: Normal breath sounds.   Abdominal:      General: Abdomen is flat. Bowel sounds are normal. There is no distension.      Palpations: Abdomen is soft. There is no mass.      Tenderness: There is no abdominal tenderness. There is no guarding or rebound.      Hernia: No hernia is present.   Musculoskeletal:         General: Normal range of motion.      Cervical back: Normal range of " motion and neck supple.      Right lower leg: No edema.      Left lower leg: No edema.   Skin:     General: Skin is warm and dry.      Coloration: Skin is not jaundiced or pale.      Findings: No rash.   Neurological:      General: No focal deficit present.      Mental Status: She is alert and oriented to person, place, and time. Mental status is at baseline.   Psychiatric:         Mood and Affect: Mood normal.         Behavior: Behavior normal.         Thought Content: Thought content normal.         Judgment: Judgment normal.          No results found for this or any previous visit (from the past 24 hours).    Assessment & Plan:  1. Encounter to establish care  New patient labs drawn today, patient not fasting. Lipid to be completed another day.  -     Vitamin D  -     Hemoglobin A1C  -     TSH  -     Comprehensive Metabolic Panel  -     CBC Auto Differential  -     T4, Free    2. Mild postpartum depression  - start paroxetine (PAXIL) 10 MG tablet; Take 1 tablet (10 mg total) by mouth every morning.  Dispense: 30 tablet; Refill: 0    3. Positive depression screening  Comments:  I have reviewed the positive depression score which warrants active treatment with psychotherapy and/or medications.         Follow up in about 3 weeks for lab review and med eval (around 1/13/2025).   Return to the clinic as needed.    Future Appointments   Date Time Provider Department Center   1/13/2025 10:00 AM Shirley Tee NP [facility]            DENI Conley

## 2024-12-30 ENCOUNTER — OFFICE VISIT (OUTPATIENT)
Dept: FAMILY MEDICINE | Facility: CLINIC | Age: 29
End: 2024-12-30
Payer: MEDICAID

## 2024-12-30 VITALS
SYSTOLIC BLOOD PRESSURE: 128 MMHG | OXYGEN SATURATION: 98 % | HEIGHT: 67 IN | BODY MASS INDEX: 33.74 KG/M2 | HEART RATE: 83 BPM | DIASTOLIC BLOOD PRESSURE: 72 MMHG | TEMPERATURE: 98 F | WEIGHT: 215 LBS

## 2024-12-30 DIAGNOSIS — E03.9 ACQUIRED HYPOTHYROIDISM: ICD-10-CM

## 2024-12-30 DIAGNOSIS — J10.1 INFLUENZA A: Primary | ICD-10-CM

## 2024-12-30 DIAGNOSIS — J10.1 INFLUENZA A: ICD-10-CM

## 2024-12-30 DIAGNOSIS — R05.9 COUGH, UNSPECIFIED TYPE: ICD-10-CM

## 2024-12-30 LAB
CTP QC/QA: YES
CTP QC/QA: YES
FLUAV AG NPH QL: POSITIVE
FLUBV AG NPH QL: NEGATIVE
SARS-COV-2 AG RESP QL IA.RAPID: NEGATIVE

## 2024-12-30 PROCEDURE — 3078F DIAST BP <80 MM HG: CPT | Mod: CPTII,,, | Performed by: NURSE PRACTITIONER

## 2024-12-30 PROCEDURE — 1160F RVW MEDS BY RX/DR IN RCRD: CPT | Mod: CPTII,,, | Performed by: NURSE PRACTITIONER

## 2024-12-30 PROCEDURE — 3008F BODY MASS INDEX DOCD: CPT | Mod: CPTII,,, | Performed by: NURSE PRACTITIONER

## 2024-12-30 PROCEDURE — 3074F SYST BP LT 130 MM HG: CPT | Mod: CPTII,,, | Performed by: NURSE PRACTITIONER

## 2024-12-30 PROCEDURE — 87811 SARS-COV-2 COVID19 W/OPTIC: CPT | Mod: QW,,, | Performed by: NURSE PRACTITIONER

## 2024-12-30 PROCEDURE — 1159F MED LIST DOCD IN RCRD: CPT | Mod: CPTII,,, | Performed by: NURSE PRACTITIONER

## 2024-12-30 PROCEDURE — 99214 OFFICE O/P EST MOD 30 MIN: CPT | Mod: ,,, | Performed by: NURSE PRACTITIONER

## 2024-12-30 PROCEDURE — 87400 INFLUENZA A/B EACH AG IA: CPT | Mod: QW,,, | Performed by: NURSE PRACTITIONER

## 2024-12-30 PROCEDURE — 3044F HG A1C LEVEL LT 7.0%: CPT | Mod: CPTII,,, | Performed by: NURSE PRACTITIONER

## 2024-12-30 RX ORDER — BROMPHENIRAMINE MALEATE, PSEUDOEPHEDRINE HYDROCHLORIDE, AND DEXTROMETHORPHAN HYDROBROMIDE 2; 30; 10 MG/5ML; MG/5ML; MG/5ML
10 SYRUP ORAL EVERY 8 HOURS PRN
Qty: 118 ML | Refills: 0 | Status: SHIPPED | OUTPATIENT
Start: 2024-12-30

## 2024-12-30 RX ORDER — OSELTAMIVIR PHOSPHATE 75 MG/1
75 CAPSULE ORAL 2 TIMES DAILY
Qty: 10 CAPSULE | Refills: 0 | Status: SHIPPED | OUTPATIENT
Start: 2024-12-30 | End: 2024-12-30 | Stop reason: SDUPTHER

## 2024-12-30 RX ORDER — OSELTAMIVIR PHOSPHATE 75 MG/1
75 CAPSULE ORAL 2 TIMES DAILY
Qty: 10 CAPSULE | Refills: 0 | Status: CANCELLED | OUTPATIENT
Start: 2024-12-30 | End: 2025-01-04

## 2024-12-30 RX ORDER — DEXAMETHASONE SODIUM PHOSPHATE 4 MG/ML
8 INJECTION, SOLUTION INTRA-ARTICULAR; INTRALESIONAL; INTRAMUSCULAR; INTRAVENOUS; SOFT TISSUE
Status: COMPLETED | OUTPATIENT
Start: 2024-12-30 | End: 2024-12-30

## 2024-12-30 RX ORDER — OSELTAMIVIR PHOSPHATE 75 MG/1
75 CAPSULE ORAL 2 TIMES DAILY
Qty: 10 CAPSULE | Refills: 0 | Status: SHIPPED | OUTPATIENT
Start: 2024-12-30 | End: 2025-01-04

## 2024-12-30 RX ORDER — BROMPHENIRAMINE MALEATE, PSEUDOEPHEDRINE HYDROCHLORIDE, AND DEXTROMETHORPHAN HYDROBROMIDE 2; 30; 10 MG/5ML; MG/5ML; MG/5ML
10 SYRUP ORAL EVERY 8 HOURS PRN
Qty: 118 ML | Refills: 0 | Status: SHIPPED | OUTPATIENT
Start: 2024-12-30 | End: 2024-12-30 | Stop reason: SDUPTHER

## 2024-12-30 RX ORDER — LEVOTHYROXINE SODIUM 25 UG/1
25 TABLET ORAL
Qty: 30 TABLET | Refills: 0 | Status: CANCELLED | OUTPATIENT
Start: 2024-12-30

## 2024-12-30 RX ORDER — BALOXAVIR MARBOXIL 80 MG/1
80 TABLET, FILM COATED ORAL ONCE
Qty: 1 TABLET | Refills: 0 | Status: SHIPPED | OUTPATIENT
Start: 2024-12-30 | End: 2024-12-30

## 2024-12-30 RX ORDER — BROMPHENIRAMINE MALEATE, PSEUDOEPHEDRINE HYDROCHLORIDE, AND DEXTROMETHORPHAN HYDROBROMIDE 2; 30; 10 MG/5ML; MG/5ML; MG/5ML
10 SYRUP ORAL EVERY 8 HOURS PRN
Qty: 118 ML | Refills: 0 | Status: CANCELLED | OUTPATIENT
Start: 2024-12-30

## 2024-12-30 RX ORDER — LEVOTHYROXINE SODIUM 25 UG/1
25 TABLET ORAL
Qty: 30 TABLET | Refills: 0 | Status: SHIPPED | OUTPATIENT
Start: 2024-12-30

## 2024-12-30 RX ORDER — BALOXAVIR MARBOXIL 80 MG/1
80 TABLET, FILM COATED ORAL ONCE
Qty: 1 TABLET | Refills: 0 | Status: SHIPPED | OUTPATIENT
Start: 2024-12-30 | End: 2024-12-30 | Stop reason: SDUPTHER

## 2024-12-30 RX ADMIN — DEXAMETHASONE SODIUM PHOSPHATE 8 MG: 4 INJECTION, SOLUTION INTRA-ARTICULAR; INTRALESIONAL; INTRAMUSCULAR; INTRAVENOUS; SOFT TISSUE at 11:12

## 2024-12-30 NOTE — PROGRESS NOTES
Patient ID: Kennedi Adrian  : 1995    Chief Complaint: Cough (X few days. Getting worse. ) and Generalized Body Aches (X few days. Getting worse. Recently been exposed to someone who has had covid)    Allergies: Patient has No Known Allergies.     History of Present Illness:  The patient presents to clinic for Cough (X few days. Getting worse. ) and Generalized Body Aches (X few days. Getting worse. Recently been exposed to someone who has had covid).     Social History:  reports that she has never smoked. She has never used smokeless tobacco. She reports current alcohol use. She reports that she does not use drugs.    Past Medical History:  has a past medical history of Acute mastitis of left breast, ADHD (attention deficit hyperactivity disorder), Asthma, Breast pain, left, Cellulitis of left breast, Hypothyroidism, unspecified, Left breast abscess, MRSA (methicillin resistant staph aureus) culture positive, and Thyroid disease.    Surgical History:   Past Surgical History:   Procedure Laterality Date    AUGMENTATION OF BREAST Bilateral 2019     SECTION N/A 2024    Procedure:  SECTION;  Surgeon: Alberto Chu Jr., MD;  Location: [facility];  Service: OB/GYN;  Laterality: N/A;    INCISION AND DRAINAGE OF ABSCESS Left 2024    Procedure: INCISION AND DRAINAGE, ABSCESS;  Surgeon: Natalie Clemente MD;  Location: [facility];  Service: General;  Laterality: Left;       Current Medications:  Current Outpatient Medications   Medication Instructions    brompheniramine-pseudoeph-DM (BROMFED DM) 2-30-10 mg/5 mL Syrp 10 mLs, Oral, Every 8 hours PRN    CABRERA 0.35 mg tablet 1 tablet    HYDROcodone-acetaminophen (NORCO) 5-325 mg per tablet 1 tablet, Oral, Every 6 hours PRN    levothyroxine (SYNTHROID) 25 mcg, Oral, Before breakfast    oseltamivir (TAMIFLU) 75 mg, Oral, 2 times daily    paroxetine (PAXIL) 10 mg, Oral, Every morning       Review of Systems   Constitutional:  Positive for fatigue  "and fever. Negative for chills.   HENT:  Positive for rhinorrhea and sore throat.    Respiratory:  Positive for cough. Negative for shortness of breath.    Integumentary:  Negative for rash.   All other systems reviewed and are negative.     A comprehensive review of symptoms was completed and negative except as noted above.    Visit Vitals  /72 (Patient Position: Sitting)   Pulse 83   Temp 98 °F (36.7 °C)   Ht 5' 7" (1.702 m)   Wt 97.5 kg (215 lb)   LMP 12/27/2024 (Exact Date)   SpO2 98%   Breastfeeding Yes   BMI 33.67 kg/m²       Physical Exam  Vitals and nursing note reviewed.   Constitutional:       General: She is not in acute distress.     Appearance: Normal appearance. She is not toxic-appearing.   HENT:      Head: Normocephalic and atraumatic.      Right Ear: Tympanic membrane, ear canal and external ear normal.      Left Ear: Tympanic membrane, ear canal and external ear normal.      Nose: Congestion and rhinorrhea present.      Mouth/Throat:      Mouth: Mucous membranes are moist.      Pharynx: Oropharynx is clear.   Eyes:      Extraocular Movements: Extraocular movements intact.      Conjunctiva/sclera: Conjunctivae normal.      Pupils: Pupils are equal, round, and reactive to light.   Cardiovascular:      Rate and Rhythm: Normal rate and regular rhythm.      Heart sounds: Normal heart sounds. No murmur heard.     No friction rub. No gallop.   Pulmonary:      Effort: Pulmonary effort is normal.      Breath sounds: Normal breath sounds.   Musculoskeletal:         General: Normal range of motion.      Cervical back: Normal range of motion and neck supple.   Skin:     General: Skin is warm and dry.      Coloration: Skin is not jaundiced or pale.      Findings: No rash.   Neurological:      General: No focal deficit present.      Mental Status: She is alert and oriented to person, place, and time. Mental status is at baseline.   Psychiatric:         Mood and Affect: Mood normal.         Behavior: " Behavior normal.         Thought Content: Thought content normal.         Judgment: Judgment normal.          Labs Reviewed:  Chemistry:  Lab Results   Component Value Date     12/23/2024    K 4.6 12/23/2024    BUN 11 12/23/2024    CREATININE 0.75 12/23/2024    EGFRNORACEVR >90 12/23/2024    GLUCOSE 74 12/23/2024    CALCIUM 9.0 12/23/2024    ALKPHOS 75 12/23/2024    LABPROT 7.4 12/23/2024    ALBUMIN 4.4 12/23/2024    AST 31 12/23/2024    ALT 24 12/23/2024    [id number] 27 (L) 12/23/2024    TSH 6.300 (H) 12/23/2024    AVWNLP2IMNE 0.81 12/23/2024        Lab Results   Component Value Date    HGBA1C 4.8 12/23/2024        Hematology:  Lab Results   Component Value Date    WBC 5.26 12/23/2024    RBC 4.89 12/23/2024    HGB 11.7 (L) 12/23/2024    HCT 37.6 12/23/2024    MCV 76.9 (L) 12/23/2024    MCH 23.9 (L) 12/23/2024    MCHC 31.1 12/23/2024    RDW 16.5 (H) 12/23/2024     12/23/2024    MPV 11.4 12/23/2024           Assessment & Plan:  1. Influenza A  -     POCT Influenza A/B- influenza A positive.   -     SARS Coronavirus 2 Antigen, POCT Manual Read- negative.   **patient changed pharmacies and prescriptions d/t availability and cost.   Rotate Motrin and Tylenol as needed for fever/body aches, use according to label instructions.   Drink plenty of fluids.     -     Discontinue: oseltamivir (TAMIFLU) 75 MG capsule; Take 1 capsule (75 mg total) by mouth 2 (two) times daily. for 5 days  Dispense: 10 capsule; Refill: 0  -     Discontinue: brompheniramine-pseudoeph-DM (BROMFED DM) 2-30-10 mg/5 mL Syrp; Take 10 mLs by mouth every 8 (eight) hours as needed (c/c /c).  Dispense: 118 mL; Refill: 0  -     Discontinue: baloxavir marboxiL (XOFLUZA) 80 mg tablet; Take 1 tablet (80 mg total) by mouth once. for 1 dose  Dispense: 1 tablet; Refill: 0  -     Discontinue: baloxavir marboxiL (XOFLUZA) 80 mg tablet; Take 1 tablet (80 mg total) by mouth once. for 1 dose  Dispense: 1 tablet; Refill: 0  -     Discontinue:  oseltamivir (TAMIFLU) 75 MG capsule; Take 1 capsule (75 mg total) by mouth 2 (two) times daily. for 5 days  Dispense: 10 capsule; Refill: 0  -     brompheniramine-pseudoeph-DM (BROMFED DM) 2-30-10 mg/5 mL Syrp; Take 10 mLs by mouth every 8 (eight) hours as needed (c/c /c).  Dispense: 118 mL; Refill: 0  -     oseltamivir (TAMIFLU) 75 MG capsule; Take 1 capsule (75 mg total) by mouth 2 (two) times daily. for 5 days  Dispense: 10 capsule; Refill: 0    2. Cough, unspecified type  -     dexAMETHasone injection 8 mg IM x1 in clinic.     3. Acquired hypothyroidism  - start    levothyroxine (SYNTHROID) 25 MCG tablet; Take 1 tablet (25 mcg total) by mouth before breakfast.  Dispense: 30 tablet; Refill: 0  Repeat thyroid studies in about 6 weeks.   -     TSH; Future; Expected date: 12/30/2024  -     T4, Free; Future; Expected date: 12/30/2024         Follow up in about 7 weeks (around 2/17/2025) for Fasting Labs Prior- FLP(lipid), TSH, T4.   Return to the clinic as needed.    Future Appointments   Date Time Provider Department Center   2/18/2025  9:00 AM Shirley Tee NP [facility]            Shirley Tee, BRONWYNP-C

## 2025-01-27 RX ORDER — PAROXETINE 10 MG/1
10 TABLET, FILM COATED ORAL EVERY MORNING
Qty: 30 TABLET | Refills: 0 | Status: SHIPPED | OUTPATIENT
Start: 2025-01-27 | End: 2025-01-28 | Stop reason: SDUPTHER

## 2025-01-28 ENCOUNTER — TELEPHONE (OUTPATIENT)
Dept: FAMILY MEDICINE | Facility: CLINIC | Age: 30
End: 2025-01-28
Payer: MEDICAID

## 2025-01-28 DIAGNOSIS — E03.9 ACQUIRED HYPOTHYROIDISM: ICD-10-CM

## 2025-01-28 RX ORDER — PAROXETINE 10 MG/1
10 TABLET, FILM COATED ORAL EVERY MORNING
Qty: 30 TABLET | Refills: 0 | Status: SHIPPED | OUTPATIENT
Start: 2025-01-28

## 2025-01-28 RX ORDER — LEVOTHYROXINE SODIUM 25 UG/1
25 TABLET ORAL
Qty: 30 TABLET | Refills: 0 | Status: SHIPPED | OUTPATIENT
Start: 2025-01-28

## 2025-02-06 ENCOUNTER — TELEPHONE (OUTPATIENT)
Dept: FAMILY MEDICINE | Facility: CLINIC | Age: 30
End: 2025-02-06
Payer: MEDICAID

## 2025-02-06 DIAGNOSIS — E03.9 ACQUIRED HYPOTHYROIDISM: ICD-10-CM

## 2025-02-06 RX ORDER — PAROXETINE 10 MG/1
10 TABLET, FILM COATED ORAL EVERY MORNING
Qty: 30 TABLET | Refills: 0 | Status: SHIPPED | OUTPATIENT
Start: 2025-02-06 | End: 2025-02-25 | Stop reason: SDUPTHER

## 2025-02-06 RX ORDER — LEVOTHYROXINE SODIUM 25 UG/1
25 TABLET ORAL
Qty: 30 TABLET | Refills: 0 | Status: SHIPPED | OUTPATIENT
Start: 2025-02-06 | End: 2025-02-25 | Stop reason: DRUGHIGH

## 2025-02-18 PROCEDURE — 84443 ASSAY THYROID STIM HORMONE: CPT | Performed by: NURSE PRACTITIONER

## 2025-02-18 PROCEDURE — 84439 ASSAY OF FREE THYROXINE: CPT | Performed by: NURSE PRACTITIONER

## 2025-02-18 PROCEDURE — 80061 LIPID PANEL: CPT | Performed by: NURSE PRACTITIONER

## 2025-02-25 ENCOUNTER — OFFICE VISIT (OUTPATIENT)
Dept: FAMILY MEDICINE | Facility: CLINIC | Age: 30
End: 2025-02-25
Payer: MEDICAID

## 2025-02-25 VITALS
BODY MASS INDEX: 33.43 KG/M2 | TEMPERATURE: 97 F | HEART RATE: 79 BPM | OXYGEN SATURATION: 98 % | HEIGHT: 67 IN | SYSTOLIC BLOOD PRESSURE: 118 MMHG | DIASTOLIC BLOOD PRESSURE: 74 MMHG | WEIGHT: 213 LBS

## 2025-02-25 DIAGNOSIS — E66.811 CLASS 1 OBESITY DUE TO EXCESS CALORIES WITHOUT SERIOUS COMORBIDITY WITH BODY MASS INDEX (BMI) OF 33.0 TO 33.9 IN ADULT: ICD-10-CM

## 2025-02-25 DIAGNOSIS — E66.09 CLASS 1 OBESITY DUE TO EXCESS CALORIES WITHOUT SERIOUS COMORBIDITY WITH BODY MASS INDEX (BMI) OF 33.0 TO 33.9 IN ADULT: ICD-10-CM

## 2025-02-25 DIAGNOSIS — E03.9 ACQUIRED HYPOTHYROIDISM: Primary | ICD-10-CM

## 2025-02-25 DIAGNOSIS — F98.8 ATTENTION DEFICIT DISORDER, UNSPECIFIED TYPE: ICD-10-CM

## 2025-02-25 PROBLEM — N61.1 BREAST ABSCESS: Status: RESOLVED | Noted: 2024-11-05 | Resolved: 2025-02-25

## 2025-02-25 PROCEDURE — 1160F RVW MEDS BY RX/DR IN RCRD: CPT | Mod: CPTII,,, | Performed by: NURSE PRACTITIONER

## 2025-02-25 PROCEDURE — 1159F MED LIST DOCD IN RCRD: CPT | Mod: CPTII,,, | Performed by: NURSE PRACTITIONER

## 2025-02-25 PROCEDURE — 3074F SYST BP LT 130 MM HG: CPT | Mod: CPTII,,, | Performed by: NURSE PRACTITIONER

## 2025-02-25 PROCEDURE — 3078F DIAST BP <80 MM HG: CPT | Mod: CPTII,,, | Performed by: NURSE PRACTITIONER

## 2025-02-25 PROCEDURE — 99214 OFFICE O/P EST MOD 30 MIN: CPT | Mod: ,,, | Performed by: NURSE PRACTITIONER

## 2025-02-25 PROCEDURE — 3008F BODY MASS INDEX DOCD: CPT | Mod: CPTII,,, | Performed by: NURSE PRACTITIONER

## 2025-02-25 RX ORDER — LEVOTHYROXINE SODIUM 50 UG/1
50 TABLET ORAL
Qty: 30 TABLET | Refills: 2 | Status: SHIPPED | OUTPATIENT
Start: 2025-03-06

## 2025-02-25 RX ORDER — PAROXETINE 10 MG/1
10 TABLET, FILM COATED ORAL EVERY MORNING
Qty: 30 TABLET | Refills: 0 | Status: SHIPPED | OUTPATIENT
Start: 2025-03-06

## 2025-02-25 RX ORDER — LISDEXAMFETAMINE DIMESYLATE 30 MG/1
30 CAPSULE ORAL EVERY MORNING
Qty: 30 CAPSULE | Refills: 0 | Status: SHIPPED | OUTPATIENT
Start: 2025-02-25

## 2025-02-25 NOTE — PROGRESS NOTES
Patient ID: Kennedi Adrian  : 1995    Chief Complaint: Follow-up (7 week follow up) and Results (Go over labs from )    Allergies: Patient has no known allergies.     History of Present Illness    CHIEF COMPLAINT:  Patient presents today for lab review    THYROID DISORDER:  She reports thyroid levels increased from 6.3 on  to 7.5 on 2025. She recently started Synthroid 25mg on 2025, having only been on it for approximately 12 days. She reports previous doses ranging from 75 to 125mg. She experienced delays in obtaining the medication due to pharmacy availability issues.    WEIGHT MANAGEMENT:  She reports current weight of 213 lbs, significantly higher than her baseline weight of 170 lbs. She expresses concern about inability to lose weight despite diet and exercise efforts, attributing this to her thyroid condition.    MENTAL HEALTH:  She has mild postpartum depression managed with Paxil. She has a history of ADHD diagnosed in childhood, previously treated with Concerta which was discontinued during high school/college years. She currently reports scattered behavior, manifesting as difficulty maintaining focus while performing multiple household tasks simultaneously.    RECENT MEDICAL HISTORY:  She established care on 2024 and developed influenza on .     Social History:  reports that she has never smoked. She has never used smokeless tobacco. She reports current alcohol use. She reports that she does not use drugs.    Past Medical History:  has a past medical history of Acute mastitis of left breast, Asthma, Breast pain, left, Cellulitis of left breast, Left breast abscess, and MRSA (methicillin resistant staph aureus) culture positive.    Surgical History:   Past Surgical History:   Procedure Laterality Date    AUGMENTATION OF BREAST Bilateral 2019     SECTION N/A 2024    Procedure:  SECTION;  Surgeon: Alberto Chu  "MD Valerie;  Location: [facility] L[facility];  Service: OB/GYN;  Laterality: N/A;    INCISION AND DRAINAGE OF ABSCESS Left 11/5/2024    Procedure: INCISION AND DRAINAGE, ABSCESS;  Surgeon: Natalie Clemente MD;  Location: [facility];  Service: General;  Laterality: Left;       Current Medications:  Current Outpatient Medications   Medication Instructions    [START ON 3/6/2025] levothyroxine (SYNTHROID) 50 mcg, Oral, Before breakfast    lisdexamfetamine (VYVANSE) 30 mg, Oral, Every morning    [START ON 3/6/2025] paroxetine (PAXIL) 10 mg, Oral, Every morning       Review of Systems   A comprehensive review of symptoms was completed and negative except as noted above.    Visit Vitals  /74 (Patient Position: Sitting)   Pulse 79   Temp 97 °F (36.1 °C)   Ht 5' 7" (1.702 m)   Wt 96.6 kg (213 lb)   SpO2 98%   Breastfeeding No   BMI 33.36 kg/m²       Physical Exam  Vitals and nursing note reviewed.   Constitutional:       General: She is not in acute distress.     Appearance: Normal appearance. She is not toxic-appearing.   HENT:      Head: Normocephalic and atraumatic.      Nose: Nose normal.      Mouth/Throat:      Mouth: Mucous membranes are moist.      Pharynx: Oropharynx is clear.   Eyes:      Extraocular Movements: Extraocular movements intact.      Conjunctiva/sclera: Conjunctivae normal.      Pupils: Pupils are equal, round, and reactive to light.   Cardiovascular:      Rate and Rhythm: Normal rate and regular rhythm.      Heart sounds: Normal heart sounds. No murmur heard.     No friction rub. No gallop.   Pulmonary:      Effort: Pulmonary effort is normal.      Breath sounds: Normal breath sounds.   Musculoskeletal:         General: Normal range of motion.      Cervical back: Normal range of motion and neck supple.   Skin:     General: Skin is warm and dry.      Coloration: Skin is not jaundiced or pale.      Findings: No rash.   Neurological:      General: No focal deficit present.      Mental Status: She is alert and oriented " to person, place, and time. Mental status is at baseline.   Psychiatric:         Mood and Affect: Mood normal.         Behavior: Behavior normal.         Thought Content: Thought content normal.         Judgment: Judgment normal.        Labs Reviewed:  Chemistry:  Lab Results   Component Value Date     12/23/2024    K 4.6 12/23/2024    BUN 11 12/23/2024    CREATININE 0.75 12/23/2024    EGFRNORACEVR >90 12/23/2024    GLUCOSE 74 12/23/2024    CALCIUM 9.0 12/23/2024    ALKPHOS 75 12/23/2024    LABPROT 7.4 12/23/2024    ALBUMIN 4.4 12/23/2024    AST 31 12/23/2024    ALT 24 12/23/2024    [id number] 27 (L) 12/23/2024    TSH 7.520 (H) 02/18/2025    TEZHRI3FNUI 0.61 (L) 02/18/2025        Lab Results   Component Value Date    HGBA1C 4.8 12/23/2024        Hematology:  Lab Results   Component Value Date    WBC 5.26 12/23/2024    RBC 4.89 12/23/2024    HGB 11.7 (L) 12/23/2024    HCT 37.6 12/23/2024    MCV 76.9 (L) 12/23/2024    MCH 23.9 (L) 12/23/2024    MCHC 31.1 12/23/2024    RDW 16.5 (H) 12/23/2024     12/23/2024    MPV 11.4 12/23/2024       Lipid Panel:  Lab Results   Component Value Date    CHOL 136 02/18/2025    HDL 45 02/18/2025    TRIG 63 02/18/2025        No results found for this or any previous visit (from the past 24 hours).    Assessment & Plan:  1. Acquired hypothyroidism  Overview:  On levothyroxine (SYNTHROID) 25 MCG tablet po before breakfast.     Orders:  -     levothyroxine (SYNTHROID) 50 MCG tablet; Take 1 tablet (50 mcg total) by mouth before breakfast.  Dispense: 30 tablet; Refill: 2    2. Mild postpartum depression  Overview:  On paroxetine (PAXIL) 10 MG tablet po daily.     Orders:  -     paroxetine (PAXIL) 10 MG tablet; Take 1 tablet (10 mg total) by mouth every morning.  Dispense: 30 tablet; Refill: 0    3. Attention deficit disorder, unspecified type  Overview:  On lisdexamfetamine (VYVANSE) 30 MG capsule po daily.     Orders:  -     lisdexamfetamine (VYVANSE) 30 MG capsule; Take 1  capsule (30 mg total) by mouth every morning.  Dispense: 30 capsule; Refill: 0    4. Class 1 obesity due to excess calories without serious comorbidity with body mass index (BMI) of 33.0 to 33.9 in adult       Assessment & Plan    IMPRESSION:  - Assessed thyroid function: TSH increased from 6.3 on 12/23/24 to 7.5 on 2/18/25, indicating continued hypothyroidism  - Evaluated weight loss options in context of hypothyroidism and patient's history of ADHD  - Considered Vyvanse for appetite suppression and potential ADHD management  - Assessed GLP-1 agonists for weight loss, noting potential side effects and contraindications    HYPOTHYROIDISM:  - Increased Synthroid dosage from 25 mcg to 50 mcg daily, effective March 7th.  - Ordered thyroid function test for 6 weeks after March 6th.  - Scheduled follow up in 6 weeks after March 6th for thyroid level check.    WEIGHT MANAGEMENT:  - Calculated the patient's BMI as 30, indicating obesity.  - Discussed GLP-1 agonists as a potential weight loss option, including: - Mechanism of action (slowing gastric emptying) - Potential side effects (nausea, vomiting, pancreatitis, acute kidney injury) - Precautions (avoiding use before surgery) - Black box warning for thyroid cancer risk  - Considered prescribing Vyvanse for appetite control and weight loss.  - Noted the patient's current weight of 213 lbs, which is significantly higher than their usual weight of 170 lbs (per patient).  - Discussed potential weight loss options, including supplements and medication.    POSTPARTUM DEPRESSION:  - Continued Paxil for postpartum depression, with refill provided for March 6th.  - Recommend continuing Paxil for a minimum of 6 months.   - Inquired about suicidal thoughts as a potential side effect of medication.    ADHD:  - Noted the patient's history of ADHD diagnosis as a young child.  - Discussed the patient's reported symptoms of scatterbrained behavior and difficulty focusing.  - Considered  prescribing Vyvanse for ADHD management.  - Discussed potential benefits of restarting ADHD medication for focus improvement.    OTHER INSTRUCTIONS:  - Noted that patient reports having had the flu in December.          Follow up in about 8 weeks (around 4/22/2025) for Non-fasting labs prior.   Return to the clinic as needed.        This note was generated with the assistance of ambient listening technology. Verbal consent was obtained by the patient and accompanying visitor(s) for the recording of patient appointment to facilitate this note. I attest to having reviewed and edited the generated note for accuracy, though some syntax or spelling errors may persist. Please contact the author of this note for any clarification.          PERRI ConleyC

## 2025-03-11 DIAGNOSIS — F98.8 ATTENTION DEFICIT DISORDER, UNSPECIFIED TYPE: ICD-10-CM

## 2025-03-11 DIAGNOSIS — E03.9 ACQUIRED HYPOTHYROIDISM: ICD-10-CM

## 2025-03-18 ENCOUNTER — TELEPHONE (OUTPATIENT)
Dept: FAMILY MEDICINE | Facility: CLINIC | Age: 30
End: 2025-03-18
Payer: MEDICAID

## 2025-03-18 RX ORDER — LISDEXAMFETAMINE DIMESYLATE 30 MG/1
30 CAPSULE ORAL EVERY MORNING
Qty: 30 CAPSULE | Refills: 0 | Status: SHIPPED | OUTPATIENT
Start: 2025-03-18

## 2025-03-18 RX ORDER — PAROXETINE 10 MG/1
10 TABLET, FILM COATED ORAL EVERY MORNING
Qty: 30 TABLET | Refills: 0 | Status: SHIPPED | OUTPATIENT
Start: 2025-03-18

## 2025-03-18 RX ORDER — LEVOTHYROXINE SODIUM 50 UG/1
50 TABLET ORAL
Qty: 30 TABLET | Refills: 2 | Status: SHIPPED | OUTPATIENT
Start: 2025-03-18

## 2025-04-15 ENCOUNTER — TELEPHONE (OUTPATIENT)
Dept: FAMILY MEDICINE | Facility: CLINIC | Age: 30
End: 2025-04-15
Payer: MEDICAID

## 2025-04-15 ENCOUNTER — PATIENT MESSAGE (OUTPATIENT)
Facility: CLINIC | Age: 30
End: 2025-04-15
Payer: MEDICAID

## 2025-04-15 PROCEDURE — 84443 ASSAY THYROID STIM HORMONE: CPT | Performed by: NURSE PRACTITIONER

## 2025-04-15 PROCEDURE — 84439 ASSAY OF FREE THYROXINE: CPT | Performed by: NURSE PRACTITIONER

## 2025-04-15 RX ORDER — PAROXETINE 10 MG/1
10 TABLET, FILM COATED ORAL EVERY MORNING
Qty: 30 TABLET | Refills: 0 | Status: SHIPPED | OUTPATIENT
Start: 2025-04-15

## 2025-04-22 ENCOUNTER — OFFICE VISIT (OUTPATIENT)
Dept: FAMILY MEDICINE | Facility: CLINIC | Age: 30
End: 2025-04-22
Payer: MEDICAID

## 2025-04-22 DIAGNOSIS — E03.9 ACQUIRED HYPOTHYROIDISM: Primary | ICD-10-CM

## 2025-04-22 DIAGNOSIS — F98.8 ATTENTION DEFICIT DISORDER, UNSPECIFIED TYPE: ICD-10-CM

## 2025-04-22 RX ORDER — LISDEXAMFETAMINE DIMESYLATE 30 MG/1
30 CAPSULE ORAL EVERY MORNING
Qty: 30 CAPSULE | Refills: 0 | Status: SHIPPED | OUTPATIENT
Start: 2025-04-22

## 2025-04-22 RX ORDER — LEVOTHYROXINE SODIUM 75 UG/1
75 TABLET ORAL
Qty: 30 TABLET | Refills: 11 | Status: SHIPPED | OUTPATIENT
Start: 2025-04-22 | End: 2026-04-22

## 2025-04-24 NOTE — PROGRESS NOTES
Patient ID: Kennedi Adrian  : 1995     Chief Complaint: follow up    Allergies: Patient has no known allergies.     This is a telemedicine note. Patient was treated using telemedicine, real time audio and video, according to [facility] protocols. Shirley VELAZQUEZ FNP-C, conducted the visit from the [facility]. The patient participated in the visit at a non-[facility] location selected by the patient. I am licensed in the state where the patient stated they are located. The patient stated that they understood and accepted the privacy and security risks to their information at their location. The patient verbally consented to proceed with a telemedicine visit. This visit is not recorded. Patient was located at the patient's home.    Video Time Documentation:  Time of call:   Spent _7__minutes with patient face to face discussed health concerns.     History of Present Illness:  Patient presents to clinic via telemedicine for follow up, lab review.      MEDICATIONS:  She takes Synthroid 50 mcg in the morning with breakfast for hypothyroidism, Paxil 10 mg daily for postpartum depression, and Vyvanse 30 mg daily for ADD.    ADD:  She reports good response to Vyvanse with increased productivity and denies need for dosage adjustment. She reports no sleep disturbances, palpitations, or chest pain associated with the medication.    THYROID:  TSH remains elevated at 7.    MENTAL HEALTH:  She reports mild postpartum depression.    WEIGHT:  Her weight is 192 lbs, down from 213 lbs in February, with a previous baseline of 170 lbs.      ROS:  General: -fever, -chills, -fatigue, -weight gain, +weight loss  Eyes: -vision changes, -redness, -discharge  ENT: -ear pain, -nasal congestion, -sore throat  Cardiovascular: -chest pain, -palpitations, -lower extremity edema  Respiratory: -cough, -shortness of breath  Gastrointestinal: -abdominal pain, -nausea, -vomiting, -diarrhea, -constipation, -blood in  stool  Genitourinary: -dysuria, -hematuria, -frequency  Musculoskeletal: -joint pain, -muscle pain  Skin: -rash, -lesion  Neurological: -headache, -dizziness, -numbness, -tingling  Psychiatric: -anxiety, -depression, -sleep difficulty          Past Medical History:  has a past medical history of Acute mastitis of left breast, Asthma, Breast pain, left, Cellulitis of left breast, Left breast abscess, and MRSA (methicillin resistant staph aureus) culture positive.    Social History:  reports that she has never smoked. She has never used smokeless tobacco. She reports current alcohol use. She reports that she does not use drugs.    Family History: family history includes Hypothyroidism in her mother; Neuropathy in her mother; No Known Problems in her father.    Care Team: Patient Care Team:  Shirley Tee NP as PCP - General (Family Medicine)  Alberto Chu MD     Current Medications:  Current Outpatient Medications   Medication Instructions    levothyroxine (SYNTHROID) 75 mcg, Oral, Before breakfast    lisdexamfetamine (VYVANSE) 30 mg, Oral, Every morning    paroxetine (PAXIL) 10 mg, Oral, Every morning       Patient has no known allergies.     Review of Systems   Constitutional:  Negative for activity change and unexpected weight change.   HENT:  Negative for hearing loss, rhinorrhea and trouble swallowing.    Eyes:  Negative for discharge and visual disturbance.   Respiratory:  Negative for chest tightness and wheezing.    Cardiovascular:  Negative for chest pain and palpitations.   Gastrointestinal:  Negative for blood in stool, constipation, diarrhea and vomiting.   Endocrine: Negative for polydipsia and polyuria.   Genitourinary:  Negative for difficulty urinating, dysuria, hematuria and menstrual problem.   Musculoskeletal:  Negative for arthralgias, joint swelling and neck pain.   Neurological:  Negative for weakness and headaches.   Psychiatric/Behavioral:  Negative for confusion and dysphoric mood.     All other systems reviewed and are negative.       There were no vitals taken for this visit.    Physical Exam: LIMITED DUE TO TELEMEDICINE RESTRICTIONS.  General: Well nourished, Alert and oriented, No acute distress.  ENMT: Sclera non-icteric, conversational hearing normal.  Respiratory: Non-labored respirations, no dyspnea with conversation.  Musculoskeletal: normal movement of all extremities that are visible in video frame  Integumentary:  No visible suspicious lesions or rashes. No diaphoresis noted.   Psychiatric: Normal interaction, Coherent speech, Euthymic mood, Appropriate affect     Assessment & Plan:  1. Acquired hypothyroidism  TSH remains elevated. Increased synthroid to 75mcg. Repeat TSH in 6 weeks.   Overview:  On levothyroxine (SYNTHROID) 75 MCG tablet po before breakfast.     Orders:  -     levothyroxine (SYNTHROID) 75 MCG tablet; Take 1 tablet (75 mcg total) by mouth before breakfast.  Dispense: 30 tablet; Refill: 11    2. Attention deficit disorder, unspecified type.  Controlled. Continue med as rx.   Overview:  On lisdexamfetamine (VYVANSE) 30 MG capsule po daily.     Orders:  - refill    lisdexamfetamine (VYVANSE) 30 MG capsule; Take 1 capsule (30 mg total) by mouth every morning.  Dispense: 30 capsule; Refill: 0    3. Mild postpartum depression  Overview:  On paroxetine (PAXIL) 10 MG tablet po daily.          Assessment & Plan    IMPRESSION:  - TSH remains elevated at 7 despite previous Synthroid dose increase.  - Increased Synthroid dosage from 50 mcg to 75 mcg daily to address persistent hypothyroidism, with potential further increase to 100 mcg in 4-6 weeks.  - Evaluated effectiveness of current Vyvanse 30 mg daily dosage for ADHD management.  - Weight loss progress improved from 213 lbs in February to current 192 lbs.    HYPOTHYROIDISM:  - Monitored the patient's known hypothyroidism and noted that TSH level is still elevated at 7.  - Evaluated that the current dosage of Synthroid is  not sufficient based on the elevated TSH level.  - Assessed the current dosage and calculated the target dosage between 100 and 150 mcg.  - Increased Synthroid dosage from 50 mcg to 75 mcg daily to address persistent hypothyroidism.  - Planned to increase the dosage to 100 mcg in 4-6 weeks if needed.  - Ordered thyroid function test in 6-8 weeks.  - Explained optimal timing for Synthroid administration, recommending taking it 30-60 minutes before breakfast for better absorption.    POSTPARTUM DEPRESSION:  - Monitored the patient's mild postpartum depression.  - Continued the patient's current prescription of Paxil 10 mg daily for postpartum depression.    ATTENTION-DEFICIT HYPERACTIVITY DISORDER:  - Monitored the patient's ADHD and current treatment with Vyvanse 30 mg daily.  - Evaluated the patient's response to Vyvanse, noting its effectiveness with no reported side effects such as sleep disturbances or palpitations.  - Assessed that the current dosage of Vyvanse is appropriate based on the patient's response.  - Continued Vyvanse 30 mg daily and refilled the medication.    FOLLOW-UP:  - Scheduled follow up in 30 days to reassess thyroid function and consider increasing Synthroid to 100 mcg if needed.  - Instructed the patient to contact the office if any questions arise about medication changes.          Repeat TSH in 6 weeks with virtual appt after.    Call sooner if needed.         This note was generated with the assistance of ambient listening technology. Verbal consent was obtained by the patient and accompanying visitor(s) for the recording of patient appointment to facilitate this note. I attest to having reviewed and edited the generated note for accuracy, though some syntax or spelling errors may persist. Please contact the author of this note for any clarification.         Shirley Tee NP

## 2025-05-21 ENCOUNTER — TELEPHONE (OUTPATIENT)
Dept: FAMILY MEDICINE | Facility: CLINIC | Age: 30
End: 2025-05-21
Payer: MEDICAID

## 2025-05-21 DIAGNOSIS — F98.8 ATTENTION DEFICIT DISORDER, UNSPECIFIED TYPE: ICD-10-CM

## 2025-05-21 DIAGNOSIS — E03.9 ACQUIRED HYPOTHYROIDISM: ICD-10-CM

## 2025-05-21 PROBLEM — E06.3 CHRONIC LYMPHOCYTIC THYROIDITIS: Status: ACTIVE | Noted: 2025-05-21

## 2025-05-21 PROBLEM — R94.6 ABNORMAL FINDING ON THYROID FUNCTION TEST: Status: ACTIVE | Noted: 2025-05-21

## 2025-05-21 RX ORDER — LISDEXAMFETAMINE DIMESYLATE 30 MG/1
30 CAPSULE ORAL EVERY MORNING
Qty: 30 CAPSULE | Refills: 0 | Status: CANCELLED | OUTPATIENT
Start: 2025-05-21

## 2025-05-21 RX ORDER — LISDEXAMFETAMINE DIMESYLATE 30 MG/1
30 CAPSULE ORAL EVERY MORNING
Qty: 30 CAPSULE | Refills: 0 | Status: SHIPPED | OUTPATIENT
Start: 2025-06-20

## 2025-05-21 RX ORDER — LEVOTHYROXINE SODIUM 75 UG/1
75 TABLET ORAL
Qty: 30 TABLET | Refills: 11 | Status: CANCELLED | OUTPATIENT
Start: 2025-05-21 | End: 2026-05-21

## 2025-05-21 RX ORDER — LISDEXAMFETAMINE DIMESYLATE 30 MG/1
30 CAPSULE ORAL EVERY MORNING
Qty: 30 CAPSULE | Refills: 0 | Status: SHIPPED | OUTPATIENT
Start: 2025-05-21

## 2025-05-21 RX ORDER — PAROXETINE 10 MG/1
10 TABLET, FILM COATED ORAL EVERY MORNING
Qty: 30 TABLET | Refills: 11 | Status: SHIPPED | OUTPATIENT
Start: 2025-05-21

## 2025-05-21 RX ORDER — LEVOTHYROXINE SODIUM 100 UG/1
100 TABLET ORAL
Qty: 30 TABLET | Refills: 11 | Status: SHIPPED | OUTPATIENT
Start: 2025-05-21 | End: 2026-05-21

## 2025-05-21 NOTE — TELEPHONE ENCOUNTER
Pt has 11 refills remaining on levothyroxine at pharmacy    Refill request for paroxetine and vyvanse  Last appt: 4/22/25  Next appt: 6/9/25

## 2025-06-09 PROBLEM — Z3A.30 30 WEEKS GESTATION OF PREGNANCY: Status: RESOLVED | Noted: 2024-07-03 | Resolved: 2025-06-09

## 2025-06-25 DIAGNOSIS — E03.9 ACQUIRED HYPOTHYROIDISM: ICD-10-CM

## 2025-06-25 DIAGNOSIS — F98.8 ATTENTION DEFICIT DISORDER, UNSPECIFIED TYPE: ICD-10-CM

## 2025-06-25 RX ORDER — LISDEXAMFETAMINE DIMESYLATE 30 MG/1
30 CAPSULE ORAL EVERY MORNING
Qty: 30 CAPSULE | Refills: 0 | Status: SHIPPED | OUTPATIENT
Start: 2025-06-25

## 2025-06-25 RX ORDER — PAROXETINE 10 MG/1
10 TABLET, FILM COATED ORAL EVERY MORNING
Qty: 30 TABLET | Refills: 11 | Status: SHIPPED | OUTPATIENT
Start: 2025-06-25

## 2025-06-25 RX ORDER — LEVOTHYROXINE SODIUM 100 UG/1
100 TABLET ORAL
Qty: 30 TABLET | Refills: 11 | Status: SHIPPED | OUTPATIENT
Start: 2025-06-25 | End: 2026-06-25

## 2025-06-25 NOTE — TELEPHONE ENCOUNTER
Please see the attached refill request. 4/22/25 last ov TSH lab draw scheduled for 7/1/25 7/21/25 next ov

## 2025-07-01 PROCEDURE — 84443 ASSAY THYROID STIM HORMONE: CPT | Performed by: NURSE PRACTITIONER

## 2025-07-23 ENCOUNTER — OFFICE VISIT (OUTPATIENT)
Dept: FAMILY MEDICINE | Facility: CLINIC | Age: 30
End: 2025-07-23
Payer: MEDICAID

## 2025-07-23 VITALS
BODY MASS INDEX: 29.03 KG/M2 | HEART RATE: 81 BPM | HEIGHT: 67 IN | WEIGHT: 185 LBS | OXYGEN SATURATION: 99 % | TEMPERATURE: 97 F | SYSTOLIC BLOOD PRESSURE: 109 MMHG | DIASTOLIC BLOOD PRESSURE: 71 MMHG

## 2025-07-23 DIAGNOSIS — E03.9 ACQUIRED HYPOTHYROIDISM: ICD-10-CM

## 2025-07-23 DIAGNOSIS — J06.9 UPPER RESPIRATORY TRACT INFECTION, UNSPECIFIED TYPE: Primary | ICD-10-CM

## 2025-07-23 DIAGNOSIS — R11.0 NAUSEA: ICD-10-CM

## 2025-07-23 DIAGNOSIS — F98.8 ATTENTION DEFICIT DISORDER, UNSPECIFIED TYPE: ICD-10-CM

## 2025-07-23 DIAGNOSIS — J30.89 ENVIRONMENTAL AND SEASONAL ALLERGIES: ICD-10-CM

## 2025-07-23 PROCEDURE — 3008F BODY MASS INDEX DOCD: CPT | Mod: CPTII,,, | Performed by: NURSE PRACTITIONER

## 2025-07-23 PROCEDURE — 1159F MED LIST DOCD IN RCRD: CPT | Mod: CPTII,,, | Performed by: NURSE PRACTITIONER

## 2025-07-23 PROCEDURE — 99214 OFFICE O/P EST MOD 30 MIN: CPT | Mod: ,,, | Performed by: NURSE PRACTITIONER

## 2025-07-23 PROCEDURE — 1160F RVW MEDS BY RX/DR IN RCRD: CPT | Mod: CPTII,,, | Performed by: NURSE PRACTITIONER

## 2025-07-23 PROCEDURE — 3078F DIAST BP <80 MM HG: CPT | Mod: CPTII,,, | Performed by: NURSE PRACTITIONER

## 2025-07-23 PROCEDURE — 3074F SYST BP LT 130 MM HG: CPT | Mod: CPTII,,, | Performed by: NURSE PRACTITIONER

## 2025-07-23 RX ORDER — DEXAMETHASONE SODIUM PHOSPHATE 4 MG/ML
8 INJECTION, SOLUTION INTRA-ARTICULAR; INTRALESIONAL; INTRAMUSCULAR; INTRAVENOUS; SOFT TISSUE
Status: DISCONTINUED | OUTPATIENT
Start: 2025-07-23 | End: 2025-07-23

## 2025-07-23 RX ORDER — MONTELUKAST SODIUM 10 MG/1
10 TABLET ORAL NIGHTLY
Qty: 90 TABLET | Refills: 3 | Status: SHIPPED | OUTPATIENT
Start: 2025-07-23

## 2025-07-23 RX ORDER — LISDEXAMFETAMINE DIMESYLATE 40 MG/1
40 CAPSULE ORAL EVERY MORNING
Qty: 30 CAPSULE | Refills: 0 | Status: SHIPPED | OUTPATIENT
Start: 2025-08-22

## 2025-07-23 RX ORDER — PREDNISONE 20 MG/1
40 TABLET ORAL DAILY
Qty: 10 TABLET | Refills: 0 | Status: SHIPPED | OUTPATIENT
Start: 2025-07-23 | End: 2025-07-28

## 2025-07-23 RX ORDER — LISDEXAMFETAMINE DIMESYLATE 40 MG/1
40 CAPSULE ORAL EVERY MORNING
Qty: 30 CAPSULE | Refills: 0 | Status: SHIPPED | OUTPATIENT
Start: 2025-07-23 | End: 2025-08-22

## 2025-07-23 RX ORDER — AZITHROMYCIN 250 MG/1
TABLET, FILM COATED ORAL
Qty: 6 TABLET | Refills: 0 | Status: SHIPPED | OUTPATIENT
Start: 2025-07-23

## 2025-07-23 RX ORDER — LISDEXAMFETAMINE DIMESYLATE 40 MG/1
40 CAPSULE ORAL EVERY MORNING
Qty: 30 CAPSULE | Refills: 0 | Status: SHIPPED | OUTPATIENT
Start: 2025-09-21 | End: 2025-10-21

## 2025-07-23 RX ORDER — ONDANSETRON 4 MG/1
4 TABLET, ORALLY DISINTEGRATING ORAL EVERY 8 HOURS PRN
Qty: 15 TABLET | Refills: 0 | Status: SHIPPED | OUTPATIENT
Start: 2025-07-23

## 2025-07-23 NOTE — PROGRESS NOTES
Patient ID: Kennedi Adrian  : 1995    Chief Complaint: Nasal Congestion and Cough    Allergies: Patient has no known allergies.     History of Present Illness    Patient presents today with sinus symptoms and cough    HISTORY OF PRESENT ILLNESS:  She presents with upper respiratory symptoms including watery eyes, itchy nose, coughing, and sinus drip. These symptoms have been present recently with some discomfort noted in the morning over the past two days. She denies fever. Symptoms have been intermittent with periods of feeling well during recent vacation. She visited urgent care approximately 2-3 weeks ago where Mucinex was prescribed.    MEDICATIONS:  She has tried multiple OTC medications for sinus symptoms including Zyrtec-D, Sudafed, Mucinex, Tylenol Severe Sinus, and Flonase. Sudafed seemed to worsen symptoms, making her more runny instead of drying up congestion. She has Flonase at home and reports occasional nasal dryness with prolonged use. She currently takes Paxil 10 mg and ADHD medication. She acknowledges intermittent non-compliance with thyroid medication administration, often taking it after eating or not on a completely empty stomach as recommended.    PSYCHIATRIC:  She experiences some days of feeling antsy and edgy despite medication. She notes improved focus and mood stability since resuming ADHD medication, describing herself as more mellow and able to stay on task. She denies significant side effects from Paxil, specifically no weight gain. She expresses interest in potentially adjusting Paxil dosage or eventually weaning off the medication.    THYROID DISORDER:  Her thyroid levels are at 4.6, which is slightly elevated from the high normal range of 3.7. Her thyroid levels have improved from a previous level of 7.0, though still not entirely within optimal range. She expresses awareness that her medication timing may be impacting her thyroid management.    Social History:  reports  that she has been smoking vaping with nicotine. She has never used smokeless tobacco. She reports current alcohol use. She reports that she does not use drugs.    Past Medical History:  has a past medical history of Acute mastitis of left breast, Asthma, Breast pain, left, Cellulitis of left breast, Left breast abscess, and MRSA (methicillin resistant staph aureus) culture positive.    Surgical History:   Past Surgical History:   Procedure Laterality Date    AUGMENTATION OF BREAST Bilateral 2019     SECTION N/A 2024    Procedure:  SECTION;  Surgeon: Alberto Chu Jr., MD;  Location: [facility] L&D;  Service: OB/GYN;  Laterality: N/A;    INCISION AND DRAINAGE OF ABSCESS Left 2024    Procedure: INCISION AND DRAINAGE, ABSCESS;  Surgeon: Natalie Clemente MD;  Location: [facility];  Service: General;  Laterality: Left;       Current Medications:  Current Outpatient Medications   Medication Instructions    azithromycin (ZITHROMAX Z-EMERALD) 250 MG tablet Take 2 the first day and then 1 a day after that until all are gone.    levothyroxine (SYNTHROID) 100 mcg, Oral, Before breakfast    lisdexamfetamine (VYVANSE) 30 mg, Oral, Every morning    lisdexamfetamine (VYVANSE) 40 mg, Oral, Every morning    [START ON 2025] lisdexamfetamine (VYVANSE) 40 mg, Oral, Every morning    [START ON 2025] lisdexamfetamine (VYVANSE) 40 mg, Oral, Every morning    montelukast (SINGULAIR) 10 mg, Oral, Nightly    ondansetron (ZOFRAN-ODT) 4 mg, Oral, Every 8 hours PRN    paroxetine (PAXIL) 10 mg, Oral, Every morning    predniSONE (DELTASONE) 40 mg, Oral, Daily       Review of Systems   Constitutional:  Negative for chills and fever.   HENT:  Positive for postnasal drip, rhinorrhea and sore throat. Negative for ear pain.    Respiratory:  Positive for cough. Negative for shortness of breath and wheezing.    Cardiovascular:  Negative for chest pain.   Musculoskeletal:  Negative for myalgias.   Integumentary:  Negative for rash.  "  Allergic/Immunologic: Negative for environmental allergies.   Neurological:  Positive for headaches.   All other systems reviewed and are negative.     A comprehensive review of symptoms was completed and negative except as noted above.    Visit Vitals  /71 (BP Location: Right arm, Patient Position: Sitting)   Pulse 81   Temp 97.2 °F (36.2 °C) (Temporal)   Ht 5' 7.01" (1.702 m)   Wt 83.9 kg (185 lb)   LMP 07/03/2025 (Exact Date)   SpO2 99%   Breastfeeding No   BMI 28.97 kg/m²       Physical Exam  Vitals and nursing note reviewed.   Constitutional:       General: She is not in acute distress.     Appearance: Normal appearance. She is not toxic-appearing.   HENT:      Head: Normocephalic and atraumatic.      Right Ear: Tympanic membrane, ear canal and external ear normal.      Left Ear: Tympanic membrane, ear canal and external ear normal.      Nose: Congestion and rhinorrhea present.      Mouth/Throat:      Mouth: Mucous membranes are moist.      Pharynx: Oropharynx is clear.   Eyes:      Extraocular Movements: Extraocular movements intact.      Conjunctiva/sclera: Conjunctivae normal.      Pupils: Pupils are equal, round, and reactive to light.   Cardiovascular:      Rate and Rhythm: Normal rate and regular rhythm.      Heart sounds: Normal heart sounds. No murmur heard.     No friction rub. No gallop.   Pulmonary:      Effort: Pulmonary effort is normal.      Breath sounds: Normal breath sounds.   Musculoskeletal:         General: Normal range of motion.      Cervical back: Normal range of motion and neck supple.   Skin:     General: Skin is warm and dry.      Coloration: Skin is not jaundiced or pale.      Findings: No rash.   Neurological:      General: No focal deficit present.      Mental Status: She is alert and oriented to person, place, and time. Mental status is at baseline.   Psychiatric:         Mood and Affect: Mood normal.         Behavior: Behavior normal.         Thought Content: Thought " content normal.         Judgment: Judgment normal.        Labs Reviewed:  Chemistry:  Lab Results   Component Value Date     12/23/2024    K 4.6 12/23/2024    BUN 11 12/23/2024    CREATININE 0.75 12/23/2024    EGFRNORACEVR >90 12/23/2024    CALCIUM 9.0 12/23/2024    ALKPHOS 75 12/23/2024    ALBUMIN 4.4 12/23/2024    AST 31 12/23/2024    ALT 24 12/23/2024    IJYCGOPA18TM 27 (L) 12/23/2024    TSH 4.670 (H) 07/01/2025    CAEOMJ2XZJS 0.88 04/15/2025        Lab Results   Component Value Date    HGBA1C 4.8 12/23/2024        Hematology:  Lab Results   Component Value Date    WBC 5.26 12/23/2024    RBC 4.89 12/23/2024    HGB 11.7 (L) 12/23/2024    HCT 37.6 12/23/2024    MCV 76.9 (L) 12/23/2024    MCH 23.9 (L) 12/23/2024    MCHC 31.1 12/23/2024    RDW 16.5 (H) 12/23/2024     12/23/2024    MPV 11.4 12/23/2024       Lipid Panel:  Lab Results   Component Value Date    CHOL 136 02/18/2025    HDL 45 02/18/2025    TRIG 63 02/18/2025        No results found for this or any previous visit (from the past 24 hours).    Assessment & Plan:  1. Upper respiratory tract infection, unspecified type  -     Discontinue: dexAMETHasone injection 8 mg. Patient decided against - too late in the day and afraid of not being able to sleep.     2. Attention deficit disorder, unspecified type.  Controlled.   Overview:  On lisdexamfetamine (VYVANSE) 30 MG capsule po daily.     Orders:  -     lisdexamfetamine (VYVANSE) 40 MG Cap; Take 1 capsule (40 mg total) by mouth every morning.  Dispense: 30 capsule; Refill: 0  -     lisdexamfetamine (VYVANSE) 40 MG Cap; Take 1 capsule (40 mg total) by mouth every morning.  Dispense: 30 capsule; Refill: 0  -     lisdexamfetamine (VYVANSE) 40 MG Cap; Take 1 capsule (40 mg total) by mouth every morning.  Dispense: 30 capsule; Refill: 0    3. Nausea  - rx    ondansetron (ZOFRAN-ODT) 4 MG TbDL; Take 1 tablet (4 mg total) by mouth every 8 (eight) hours as needed (n/v).  Dispense: 15 tablet; Refill: 0    4.  Environmental and seasonal allergies  -  rx   montelukast (SINGULAIR) 10 mg tablet; Take 1 tablet (10 mg total) by mouth every evening.  Dispense: 90 tablet; Refill: 3    5. Acquired hypothyroidism  On levothyroxine 100 mcg po daily before breakfast.       Latest Ref Rng & Units 2/18/2025     9:35 AM 4/15/2025    10:00 AM 7/1/2025     9:14 AM   Thyroid Labs   TSH 0.360 - 3.740 uIU/mL 7.520  7.110  4.670      6. Mild postpartum depression.  Controlled.   On Paxil 10 mg po daily.     Other orders  -     azithromycin (ZITHROMAX Z-EMERALD) 250 MG tablet; Take 2 the first day and then 1 a day after that until all are gone.  Dispense: 6 tablet; Refill: 0  -     predniSONE (DELTASONE) 20 MG tablet; Take 2 tablets (40 mg total) by mouth once daily. for 5 days  Dispense: 10 tablet; Refill: 0       Assessment & Plan    ALLERGIES:   - Assessed symptoms of watery eyes, itchy nose, coughing, sneezing, and sinus drip as likely allergic in nature.  - Discussed potential causes, including pollen.  - Administered steroid injection during visit to provide rapid relief before upcoming vacation.  - Prescribed Allegra to be taken in the morning, Singulair at night, and Flonase nasal spray as needed for comprehensive symptom control.  - Will consider addition of antibiotics if symptoms worsen.    HYPOTHYROIDISM:  - Monitored thyroid level which is borderline at 4.6 (high normal being 3.7), noting improvement from previous level of 7, indicating positive response to treatment.  - Continued current thyroid medication at same dose of 100 mcg.  - Scheduled re-evaluation of thyroid levels in 4-6 weeks.    ATTENTION-DEFICIT HYPERACTIVITY DISORDER:  - Patient reports improved focus and task completion with ADHD medication, feeling more mellow and less over the edge.  - Discussed potential dosage adjustments based on patient's response and needs.    POSTPARTUM DEPRESSION:   - Patient reports feeling antsy, edgy, and grouchy despite medication, but  denies adverse side effects from paroxetine such as weight gain.  - Increased paroxetine from 10 mg to 30 mg daily, with available increments of 10 mg up to 40 mg if needed for better mood stabilization.    MEDICATION MANAGEMENT:  - Educated patient about the importance of taking thyroid medication first thing in the morning on an empty stomach for optimal absorption, as patient sometimes forgets, affecting medication effectiveness.    NAUSEA:  - Prescribed ondansetron dissolvable tablets for nausea as needed during travel.    GENERAL FOLLOW-UP:  - Patient denies fever and reports moles look good.  - Started antibiotic and steroid.  - Follow up on Friday.          Rotate Motrin and Tylenol as needed for fever/body aches, use according to label instructions.   Drink plenty of fluids.      Follow up appt in 3 months for c/s.          This note was generated with the assistance of ambient listening technology. Verbal consent was obtained by the patient and accompanying visitor(s) for the recording of patient appointment to facilitate this note. I attest to having reviewed and edited the generated note for accuracy, though some syntax or spelling errors may persist. Please contact the author of this note for any clarification.          DENI Conley

## 2025-08-04 PROBLEM — Z34.90 ENCOUNTER FOR INDUCTION OF LABOR: Status: RESOLVED | Noted: 2024-09-06 | Resolved: 2025-08-04

## 2025-08-11 ENCOUNTER — PATIENT MESSAGE (OUTPATIENT)
Facility: CLINIC | Age: 30
End: 2025-08-11
Payer: MEDICAID

## 2025-08-12 ENCOUNTER — E-VISIT (OUTPATIENT)
Dept: FAMILY MEDICINE | Facility: CLINIC | Age: 30
End: 2025-08-12
Payer: MEDICAID

## 2025-08-12 DIAGNOSIS — B37.31 CANDIDIASIS OF GENITALIA IN FEMALE: Primary | ICD-10-CM

## 2025-08-13 RX ORDER — FLUCONAZOLE 150 MG/1
TABLET ORAL
Qty: 2 TABLET | Refills: 0 | Status: SHIPPED | OUTPATIENT
Start: 2025-08-13

## 2025-08-25 DIAGNOSIS — F98.8 ATTENTION DEFICIT DISORDER, UNSPECIFIED TYPE: ICD-10-CM

## 2025-08-25 RX ORDER — LISDEXAMFETAMINE DIMESYLATE 40 MG/1
40 CAPSULE ORAL EVERY MORNING
Qty: 30 CAPSULE | Refills: 0 | Status: CANCELLED | OUTPATIENT
Start: 2025-08-25 | End: 2025-09-24

## 2025-08-25 RX ORDER — LISDEXAMFETAMINE DIMESYLATE 40 MG/1
40 CAPSULE ORAL EVERY MORNING
Qty: 30 CAPSULE | Refills: 0 | OUTPATIENT
Start: 2025-08-25

## 2025-08-25 RX ORDER — LISDEXAMFETAMINE DIMESYLATE 30 MG/1
30 CAPSULE ORAL EVERY MORNING
Qty: 30 CAPSULE | Refills: 0 | OUTPATIENT
Start: 2025-08-25

## 2025-08-28 ENCOUNTER — E-VISIT (OUTPATIENT)
Dept: FAMILY MEDICINE | Facility: CLINIC | Age: 30
End: 2025-08-28
Payer: MEDICAID

## 2025-08-28 DIAGNOSIS — R12 HEARTBURN: Primary | ICD-10-CM

## 2025-08-28 RX ORDER — FAMOTIDINE 20 MG/1
20 TABLET, FILM COATED ORAL NIGHTLY PRN
Qty: 30 TABLET | Refills: 11 | Status: SHIPPED | OUTPATIENT
Start: 2025-08-28 | End: 2025-08-29

## 2025-08-29 RX ORDER — PANTOPRAZOLE SODIUM 40 MG/1
40 TABLET, DELAYED RELEASE ORAL DAILY PRN
Qty: 90 TABLET | Refills: 3 | Status: SHIPPED | OUTPATIENT
Start: 2025-08-29

## (undated) DEVICE — Device

## (undated) DEVICE — GLOVE PROTEXIS LTX 7.5

## (undated) DEVICE — SYR 10CC LUER LOCK

## (undated) DEVICE — PAD ELECTROSURGICAL SPL W/CORD

## (undated) DEVICE — SLEEVE SCD EXPRESS CALF MEDIUM

## (undated) DEVICE — NDL HYPO STD REG BVL 25GX1.5IN

## (undated) DEVICE — TRAY DRY SKIN SCRUB PREP